# Patient Record
Sex: FEMALE | Race: WHITE | NOT HISPANIC OR LATINO | URBAN - METROPOLITAN AREA
[De-identification: names, ages, dates, MRNs, and addresses within clinical notes are randomized per-mention and may not be internally consistent; named-entity substitution may affect disease eponyms.]

---

## 2017-05-27 ENCOUNTER — INPATIENT (INPATIENT)
Facility: HOSPITAL | Age: 57
LOS: 5 days | Discharge: HOME HEALTH | DRG: 872 | End: 2017-06-02
Attending: SPECIALIST | Admitting: SPECIALIST
Payer: COMMERCIAL

## 2017-05-27 VITALS
SYSTOLIC BLOOD PRESSURE: 145 MMHG | RESPIRATION RATE: 18 BRPM | TEMPERATURE: 101 F | HEART RATE: 125 BPM | OXYGEN SATURATION: 99 % | WEIGHT: 229.94 LBS | HEIGHT: 69 IN | DIASTOLIC BLOOD PRESSURE: 68 MMHG

## 2017-05-27 DIAGNOSIS — K57.20 DIVERTICULITIS OF LARGE INTESTINE WITH PERFORATION AND ABSCESS WITHOUT BLEEDING: ICD-10-CM

## 2017-05-27 LAB
ALBUMIN SERPL ELPH-MCNC: 2.6 G/DL — LOW (ref 3.5–5)
ALP SERPL-CCNC: 97 U/L — SIGNIFICANT CHANGE UP (ref 40–120)
ALT FLD-CCNC: 18 U/L DA — SIGNIFICANT CHANGE UP (ref 10–60)
ANION GAP SERPL CALC-SCNC: 9 MMOL/L — SIGNIFICANT CHANGE UP (ref 5–17)
AST SERPL-CCNC: 20 U/L — SIGNIFICANT CHANGE UP (ref 10–40)
BASOPHILS # BLD AUTO: 0 K/UL — SIGNIFICANT CHANGE UP (ref 0–0.2)
BASOPHILS NFR BLD AUTO: 0.3 % — SIGNIFICANT CHANGE UP (ref 0–2)
BILIRUB SERPL-MCNC: 2.6 MG/DL — HIGH (ref 0.2–1.2)
BUN SERPL-MCNC: 14 MG/DL — SIGNIFICANT CHANGE UP (ref 7–18)
CALCIUM SERPL-MCNC: 9.5 MG/DL — SIGNIFICANT CHANGE UP (ref 8.4–10.5)
CHLORIDE SERPL-SCNC: 102 MMOL/L — SIGNIFICANT CHANGE UP (ref 96–108)
CO2 SERPL-SCNC: 26 MMOL/L — SIGNIFICANT CHANGE UP (ref 22–31)
CREAT SERPL-MCNC: 0.97 MG/DL — SIGNIFICANT CHANGE UP (ref 0.5–1.3)
EOSINOPHIL # BLD AUTO: 0 K/UL — SIGNIFICANT CHANGE UP (ref 0–0.5)
EOSINOPHIL NFR BLD AUTO: 0.1 % — SIGNIFICANT CHANGE UP (ref 0–6)
GLUCOSE SERPL-MCNC: 136 MG/DL — HIGH (ref 70–99)
HCT VFR BLD CALC: 46.3 % — HIGH (ref 34.5–45)
HGB BLD-MCNC: 15.1 G/DL — SIGNIFICANT CHANGE UP (ref 11.5–15.5)
LACTATE SERPL-SCNC: 2.3 MMOL/L — HIGH (ref 0.7–2)
LYMPHOCYTES # BLD AUTO: 0.7 K/UL — LOW (ref 1–3.3)
LYMPHOCYTES # BLD AUTO: 4.8 % — LOW (ref 13–44)
MCHC RBC-ENTMCNC: 29.9 PG — SIGNIFICANT CHANGE UP (ref 27–34)
MCHC RBC-ENTMCNC: 32.7 GM/DL — SIGNIFICANT CHANGE UP (ref 32–36)
MCV RBC AUTO: 91.4 FL — SIGNIFICANT CHANGE UP (ref 80–100)
MONOCYTES # BLD AUTO: 0.6 K/UL — SIGNIFICANT CHANGE UP (ref 0–0.9)
MONOCYTES NFR BLD AUTO: 4.1 % — SIGNIFICANT CHANGE UP (ref 2–14)
NEUTROPHILS # BLD AUTO: 13.9 K/UL — HIGH (ref 1.8–7.4)
NEUTROPHILS NFR BLD AUTO: 90.8 % — HIGH (ref 43–77)
PLATELET # BLD AUTO: 271 K/UL — SIGNIFICANT CHANGE UP (ref 150–400)
POTASSIUM SERPL-MCNC: 4.2 MMOL/L — SIGNIFICANT CHANGE UP (ref 3.5–5.3)
POTASSIUM SERPL-SCNC: 4.2 MMOL/L — SIGNIFICANT CHANGE UP (ref 3.5–5.3)
PROT SERPL-MCNC: 7.7 G/DL — SIGNIFICANT CHANGE UP (ref 6–8.3)
RBC # BLD: 5.06 M/UL — SIGNIFICANT CHANGE UP (ref 3.8–5.2)
RBC # FLD: 11.7 % — SIGNIFICANT CHANGE UP (ref 10.3–14.5)
SODIUM SERPL-SCNC: 137 MMOL/L — SIGNIFICANT CHANGE UP (ref 135–145)
WBC # BLD: 15.3 K/UL — HIGH (ref 3.8–10.5)
WBC # FLD AUTO: 15.3 K/UL — HIGH (ref 3.8–10.5)

## 2017-05-27 PROCEDURE — 99291 CRITICAL CARE FIRST HOUR: CPT

## 2017-05-27 PROCEDURE — 71020: CPT | Mod: 26

## 2017-05-27 PROCEDURE — 74177 CT ABD & PELVIS W/CONTRAST: CPT | Mod: 26

## 2017-05-27 RX ORDER — VANCOMYCIN HCL 1 G
1000 VIAL (EA) INTRAVENOUS ONCE
Qty: 0 | Refills: 0 | Status: COMPLETED | OUTPATIENT
Start: 2017-05-27 | End: 2017-05-27

## 2017-05-27 RX ORDER — PANTOPRAZOLE SODIUM 20 MG/1
40 TABLET, DELAYED RELEASE ORAL DAILY
Qty: 0 | Refills: 0 | Status: DISCONTINUED | OUTPATIENT
Start: 2017-05-27 | End: 2017-06-02

## 2017-05-27 RX ORDER — CIPROFLOXACIN LACTATE 400MG/40ML
400 VIAL (ML) INTRAVENOUS EVERY 12 HOURS
Qty: 0 | Refills: 0 | Status: DISCONTINUED | OUTPATIENT
Start: 2017-05-27 | End: 2017-05-27

## 2017-05-27 RX ORDER — AZTREONAM 2 G
1000 VIAL (EA) INJECTION ONCE
Qty: 0 | Refills: 0 | Status: COMPLETED | OUTPATIENT
Start: 2017-05-27 | End: 2017-05-27

## 2017-05-27 RX ORDER — METRONIDAZOLE 500 MG
500 TABLET ORAL EVERY 8 HOURS
Qty: 0 | Refills: 0 | Status: DISCONTINUED | OUTPATIENT
Start: 2017-05-27 | End: 2017-05-31

## 2017-05-27 RX ORDER — METRONIDAZOLE 500 MG
500 TABLET ORAL ONCE
Qty: 0 | Refills: 0 | Status: DISCONTINUED | OUTPATIENT
Start: 2017-05-27 | End: 2017-05-27

## 2017-05-27 RX ORDER — CIPROFLOXACIN LACTATE 400MG/40ML
400 VIAL (ML) INTRAVENOUS ONCE
Qty: 0 | Refills: 0 | Status: COMPLETED | OUTPATIENT
Start: 2017-05-27 | End: 2017-05-27

## 2017-05-27 RX ORDER — MORPHINE SULFATE 50 MG/1
4 CAPSULE, EXTENDED RELEASE ORAL EVERY 4 HOURS
Qty: 0 | Refills: 0 | Status: DISCONTINUED | OUTPATIENT
Start: 2017-05-27 | End: 2017-06-02

## 2017-05-27 RX ORDER — ONDANSETRON 8 MG/1
4 TABLET, FILM COATED ORAL ONCE
Qty: 0 | Refills: 0 | Status: COMPLETED | OUTPATIENT
Start: 2017-05-27 | End: 2017-05-27

## 2017-05-27 RX ORDER — SODIUM CHLORIDE 9 MG/ML
1000 INJECTION INTRAMUSCULAR; INTRAVENOUS; SUBCUTANEOUS ONCE
Qty: 0 | Refills: 0 | Status: COMPLETED | OUTPATIENT
Start: 2017-05-27 | End: 2017-05-27

## 2017-05-27 RX ORDER — ONDANSETRON 8 MG/1
4 TABLET, FILM COATED ORAL EVERY 6 HOURS
Qty: 0 | Refills: 0 | Status: DISCONTINUED | OUTPATIENT
Start: 2017-05-27 | End: 2017-06-02

## 2017-05-27 RX ORDER — HEPARIN SODIUM 5000 [USP'U]/ML
5000 INJECTION INTRAVENOUS; SUBCUTANEOUS EVERY 8 HOURS
Qty: 0 | Refills: 0 | Status: DISCONTINUED | OUTPATIENT
Start: 2017-05-27 | End: 2017-06-02

## 2017-05-27 RX ORDER — AZTREONAM 2 G
1000 VIAL (EA) INJECTION EVERY 8 HOURS
Qty: 0 | Refills: 0 | Status: DISCONTINUED | OUTPATIENT
Start: 2017-05-28 | End: 2017-05-31

## 2017-05-27 RX ORDER — VANCOMYCIN HCL 1 G
1000 VIAL (EA) INTRAVENOUS EVERY 12 HOURS
Qty: 0 | Refills: 0 | Status: DISCONTINUED | OUTPATIENT
Start: 2017-05-28 | End: 2017-05-29

## 2017-05-27 RX ORDER — AZTREONAM 2 G
VIAL (EA) INJECTION
Qty: 0 | Refills: 0 | Status: DISCONTINUED | OUTPATIENT
Start: 2017-05-27 | End: 2017-05-31

## 2017-05-27 RX ORDER — SODIUM CHLORIDE 9 MG/ML
1000 INJECTION, SOLUTION INTRAVENOUS
Qty: 0 | Refills: 0 | Status: DISCONTINUED | OUTPATIENT
Start: 2017-05-27 | End: 2017-05-30

## 2017-05-27 RX ORDER — METRONIDAZOLE 500 MG
500 TABLET ORAL ONCE
Qty: 0 | Refills: 0 | Status: COMPLETED | OUTPATIENT
Start: 2017-05-27 | End: 2017-05-27

## 2017-05-27 RX ORDER — VANCOMYCIN HCL 1 G
VIAL (EA) INTRAVENOUS
Qty: 0 | Refills: 0 | Status: DISCONTINUED | OUTPATIENT
Start: 2017-05-27 | End: 2017-05-29

## 2017-05-27 RX ORDER — ACETAMINOPHEN 500 MG
650 TABLET ORAL EVERY 6 HOURS
Qty: 0 | Refills: 0 | Status: DISCONTINUED | OUTPATIENT
Start: 2017-05-27 | End: 2017-06-02

## 2017-05-27 RX ADMIN — Medication 250 MILLIGRAM(S): at 21:58

## 2017-05-27 RX ADMIN — ONDANSETRON 4 MILLIGRAM(S): 8 TABLET, FILM COATED ORAL at 16:16

## 2017-05-27 RX ADMIN — HEPARIN SODIUM 5000 UNIT(S): 5000 INJECTION INTRAVENOUS; SUBCUTANEOUS at 20:19

## 2017-05-27 RX ADMIN — SODIUM CHLORIDE 1000 MILLILITER(S): 9 INJECTION INTRAMUSCULAR; INTRAVENOUS; SUBCUTANEOUS at 16:17

## 2017-05-27 RX ADMIN — PANTOPRAZOLE SODIUM 40 MILLIGRAM(S): 20 TABLET, DELAYED RELEASE ORAL at 20:19

## 2017-05-27 RX ADMIN — Medication 200 MILLIGRAM(S): at 19:19

## 2017-05-27 RX ADMIN — Medication 50 MILLIGRAM(S): at 23:25

## 2017-05-27 RX ADMIN — Medication 100 MILLIGRAM(S): at 20:54

## 2017-05-27 NOTE — ED ADULT NURSE NOTE - OBJECTIVE STATEMENT
pt came in with c/o abdominal pain pt sent by PMD for obstruction  and abdominal distention pt abdomen soft non distended  + bowel sound mariana any vomiting but states she feels nauseaus pt came in with c/o abdominal pain pt sent by PMD for obstruction  and abdominal distention pt abdomen soft non distended  + bowel sound denies any vomiting but states she feels nausea

## 2017-05-27 NOTE — H&P ADULT - HISTORY OF PRESENT ILLNESS
56 y/o female denies PMH or PSH; c/o several days of gassy abd pain/bloating, fever; sent by PCP for eval  denies h/o diverticulitis or colonoscopy  CT done in ED shows small amt free air with concern for perf sig diverticulitis vs ileum; reactive ileus picture

## 2017-05-27 NOTE — ED PROVIDER NOTE - CRITICAL CARE PROVIDED
documentation/consult w/ pt's family directly relating to pts condition/interpretation of diagnostic studies/additional history taking/direct patient care (not related to procedure)/consultation with other physicians

## 2017-05-27 NOTE — H&P ADULT - PROBLEM SELECTOR PLAN 1
npo, hydrate, nava cath, strict IOs, repeat lactate, cbc, bmp  serial abd exams  ID-Dr Walker consulted, vanco, azactam, flagyl started  poss pre-op mode for ex lap

## 2017-05-27 NOTE — ED PROVIDER NOTE - OBJECTIVE STATEMENT
sent by pmd dr morrell for abd pain concern for obstruction  pt with complaint of pain to abd for about a week.  started with vomiting then had some loose BM.s  feels very "bloated and gases"  now feels that abd "got hit a thousand times"

## 2017-05-27 NOTE — H&P ADULT - NSHPLABSRESULTS_GEN_ALL_CORE
EXAM:  CT ABDOMEN AND PELVIS OC IC                            PROCEDURE DATE:  05/27/2017        INTERPRETATION:  CLINICAL INFORMATION: Abdominal pain and distention.    TECHNIQUE: Contrast enhanced CT of the abdomen and pelvis was performed   with coronal and sagittal reformats. 90 cc Omnipaque 350 were   administered intravenously and 10 cc were discarded. Oral contrast was   administered.     COMPARISON: None available.    FINDINGS:    LOWER CHEST: Within normal limits.    HEPATOBILIARY: Subcentimeter hypodense hepatic lesions too small to   characterize. Gallbladder and bile ducts within normal limits.  PANCREAS: Within normal limits.  SPLEEN: Within normal limits.  ADRENALS: Within normal limits.    KIDNEYS/URETERS/BLADDER: Within normal limits.  REPRODUCTIVE ORGANS: Right ovarian dermoid measuring 6.2 x 8.3 x 7.2 cm   (TR x AP x CC). Left ovarian dermoid measuring 4 x 5.3 x 3.8 cm (TR x AP   x CC). Uterus within normal limits.    BOWEL/PERITONEUM: Small amount of pneumoperitoneum. Marked mesenteric   edema and small amount of free air and fluid along the distal ileal   mesentery. Marked submucosal edema and inflammation of the distal ileum.   Sigmoid colonic diverticulosis with mild inflammatory changes abutting   the inflamed distal ileal loops. No rim-enhancing fluid collection.   Proximal contrast and fluid distended small bowel without transition   point has appearance favoring ileus. Normal appendix. Portions of the   gastrointestinal tract are collapsed, limiting evaluation.     VESSELS:  Within normal limits.  LYMPHATICS/RETROPERITONEUM: No lymphadenopathy. No retroperitoneal   hematoma.      SOFT TISSUES: Within normal limits.  BONES: Degenerative changes at L2-L3.    IMPRESSION:   Pneumoperitoneum secondary to bowel perforation. Marked submucosal edema   and inflammation of the distal ileum with air and fluid along the ileal   mesentery. Mild inflammatory changes of the adjacent sigmoid colon with   colonic diverticulosis.    Given concentration of air and fluid along the ileal mesentery, ileal   perforation with secondary inflammation of the sigmoid colon is favored,   however perforated sigmoid diverticulitis with reactive inflammation of   the distal ileum may also be considered.    Call Back:  I discussed this case with Dr. Morton at 6:30 PM on 5/27/2017.    Hospital policies for call back including read back policy were   followed. The verbal communication call back supplements this written   report.                  WILLIAMS JAEGER M.D., ATTENDING RADIOLOGIST  This document has been electronically signed. May 27 2017  6:41PM

## 2017-05-27 NOTE — H&P ADULT - NSHPPHYSICALEXAM_GEN_ALL_CORE
NAD  appears comfortable  alert/oriented x 3  abd exam obese, soft, generalized tenderness; no guarding or peritoneal signs

## 2017-05-28 DIAGNOSIS — D72.829 ELEVATED WHITE BLOOD CELL COUNT, UNSPECIFIED: ICD-10-CM

## 2017-05-28 DIAGNOSIS — R50.9 FEVER, UNSPECIFIED: ICD-10-CM

## 2017-05-28 LAB
ANION GAP SERPL CALC-SCNC: 7 MMOL/L — SIGNIFICANT CHANGE UP (ref 5–17)
BUN SERPL-MCNC: 10 MG/DL — SIGNIFICANT CHANGE UP (ref 7–18)
CALCIUM SERPL-MCNC: 8.6 MG/DL — SIGNIFICANT CHANGE UP (ref 8.4–10.5)
CHLORIDE SERPL-SCNC: 109 MMOL/L — HIGH (ref 96–108)
CO2 SERPL-SCNC: 25 MMOL/L — SIGNIFICANT CHANGE UP (ref 22–31)
CREAT SERPL-MCNC: 0.75 MG/DL — SIGNIFICANT CHANGE UP (ref 0.5–1.3)
GLUCOSE SERPL-MCNC: 109 MG/DL — HIGH (ref 70–99)
HCT VFR BLD CALC: 37.1 % — SIGNIFICANT CHANGE UP (ref 34.5–45)
HGB BLD-MCNC: 12.9 G/DL — SIGNIFICANT CHANGE UP (ref 11.5–15.5)
LACTATE SERPL-SCNC: 0.8 MMOL/L — SIGNIFICANT CHANGE UP (ref 0.7–2)
LACTATE SERPL-SCNC: 1.2 MMOL/L — SIGNIFICANT CHANGE UP (ref 0.7–2)
MCHC RBC-ENTMCNC: 31.6 PG — SIGNIFICANT CHANGE UP (ref 27–34)
MCHC RBC-ENTMCNC: 34.7 GM/DL — SIGNIFICANT CHANGE UP (ref 32–36)
MCV RBC AUTO: 91 FL — SIGNIFICANT CHANGE UP (ref 80–100)
PLATELET # BLD AUTO: 237 K/UL — SIGNIFICANT CHANGE UP (ref 150–400)
POTASSIUM SERPL-MCNC: 3.5 MMOL/L — SIGNIFICANT CHANGE UP (ref 3.5–5.3)
POTASSIUM SERPL-SCNC: 3.5 MMOL/L — SIGNIFICANT CHANGE UP (ref 3.5–5.3)
RBC # BLD: 4.07 M/UL — SIGNIFICANT CHANGE UP (ref 3.8–5.2)
RBC # FLD: 12.7 % — SIGNIFICANT CHANGE UP (ref 10.3–14.5)
SODIUM SERPL-SCNC: 141 MMOL/L — SIGNIFICANT CHANGE UP (ref 135–145)
WBC # BLD: 11.8 K/UL — HIGH (ref 3.8–10.5)
WBC # FLD AUTO: 11.8 K/UL — HIGH (ref 3.8–10.5)

## 2017-05-28 RX ORDER — SODIUM CHLORIDE 9 MG/ML
1000 INJECTION INTRAMUSCULAR; INTRAVENOUS; SUBCUTANEOUS ONCE
Qty: 0 | Refills: 0 | Status: COMPLETED | OUTPATIENT
Start: 2017-05-28 | End: 2017-05-28

## 2017-05-28 RX ADMIN — Medication 650 MILLIGRAM(S): at 21:57

## 2017-05-28 RX ADMIN — Medication 250 MILLIGRAM(S): at 17:26

## 2017-05-28 RX ADMIN — Medication 50 MILLIGRAM(S): at 21:25

## 2017-05-28 RX ADMIN — Medication 100 MILLIGRAM(S): at 13:14

## 2017-05-28 RX ADMIN — Medication 100 MILLIGRAM(S): at 21:25

## 2017-05-28 RX ADMIN — PANTOPRAZOLE SODIUM 40 MILLIGRAM(S): 20 TABLET, DELAYED RELEASE ORAL at 11:31

## 2017-05-28 RX ADMIN — Medication 50 MILLIGRAM(S): at 13:13

## 2017-05-28 RX ADMIN — HEPARIN SODIUM 5000 UNIT(S): 5000 INJECTION INTRAVENOUS; SUBCUTANEOUS at 05:15

## 2017-05-28 RX ADMIN — Medication 50 MILLIGRAM(S): at 06:53

## 2017-05-28 RX ADMIN — HEPARIN SODIUM 5000 UNIT(S): 5000 INJECTION INTRAVENOUS; SUBCUTANEOUS at 13:13

## 2017-05-28 RX ADMIN — Medication 100 MILLIGRAM(S): at 05:16

## 2017-05-28 RX ADMIN — Medication 250 MILLIGRAM(S): at 06:52

## 2017-05-28 RX ADMIN — SODIUM CHLORIDE 500 MILLILITER(S): 9 INJECTION INTRAMUSCULAR; INTRAVENOUS; SUBCUTANEOUS at 00:30

## 2017-05-28 RX ADMIN — HEPARIN SODIUM 5000 UNIT(S): 5000 INJECTION INTRAVENOUS; SUBCUTANEOUS at 21:25

## 2017-05-28 NOTE — CONSULT NOTE ADULT - SUBJECTIVE AND OBJECTIVE BOX
HPI:  58 y/o female denies PMH or PSH; c/o several days of having abd pain , bloating and  fever; sent by PCP for eval  denies h/o diverticulitis or colonoscopy      PAST MEDICAL & SURGICAL HISTORY:  Anxiety  No significant past surgical history      penicillin (Hives)      Meds:  dextrose 5% + sodium chloride 0.9%. 1000milliLiter(s) IV Continuous <Continuous>  ondansetron Injectable 4milliGRAM(s) IV Push every 6 hours PRN  morphine  - Injectable 4milliGRAM(s) IV Push every 4 hours PRN  pantoprazole  Injectable 40milliGRAM(s) IV Push daily  metroNIDAZOLE  IVPB 500milliGRAM(s) IV Intermittent every 8 hours  heparin  Injectable 5000Unit(s) SubCutaneous every 8 hours  acetaminophen  Suppository 650milliGRAM(s) Rectal every 6 hours PRN  vancomycin  IVPB  IV Intermittent   vancomycin  IVPB 1000milliGRAM(s) IV Intermittent every 12 hours  aztreonam  IVPB 1000milliGRAM(s) IV Intermittent every 8 hours  aztreonam  IVPB  IV Intermittent       SOCIAL HISTORY:  Smoker:  YES / NO       ETOH use:  YES / NO                Ilicit Drug use:  YES / NO    FAMILY HISTORY:not sig      VITALS:  Vital Signs Last 24 Hrs  T(C): 37.4, Max: 38.7 (05-27 @ 15:58)  T(F): 99.3, Max: 101.6 (05-27 @ 15:58)  HR: 93 (72 - 125)  BP: 137/60 (103/65 - 145/68)  BP(mean): --  RR: 16 (16 - 18)  SpO2: 96% (96% - 99%)    LABS/DIAGNOSTIC TESTS:                          12.9   11.8  )-----------( 237      ( 28 May 2017 05:53 )             37.1     WBC Count: 11.8 K/uL (05-28 @ 05:53)  WBC Count: 15.3 K/uL (05-27 @ 16:31)      05-28    141  |  109<H>  |  10  ----------------------------<  109<H>  3.5   |  25  |  0.75    Ca    8.6      28 May 2017 05:53    TPro  7.7  /  Alb  2.6<L>  /  TBili  2.6<H>  /  DBili  x   /  AST  20  /  ALT  18  /  AlkPhos  97  05-27          LIVER FUNCTIONS - ( 27 May 2017 16:31 )  Alb: 2.6 g/dL / Pro: 7.7 g/dL / ALK PHOS: 97 U/L / ALT: 18 U/L DA / AST: 20 U/L / GGT: x                 LACTATE:Lactate, Blood: 0.8 mmol/L (05-28 @ 05:53)  Lactate, Blood: 1.2 mmol/L (05-27 @ 23:56)  Lactate, Blood: 2.3 mmol/L (05-27 @ 16:31)      ABG -     CULTURES:       RADIOLOGY:    EXAM:  CT ABDOMEN AND PELVIS OC IC                            PROCEDURE DATE:  05/27/2017        INTERPRETATION:  CLINICAL INFORMATION: Abdominal pain and distention.    TECHNIQUE: Contrast enhanced CT of the abdomen and pelvis was performed   withcoronal and sagittal reformats. 90 cc Omnipaque 350 were   administered intravenously and 10 cc were discarded. Oral contrast was   administered.     COMPARISON: None available.    FINDINGS:    LOWER CHEST: Within normal limits.    HEPATOBILIARY: Subcentimeter hypodense hepatic lesions too small to   characterize. Gallbladder and bile ducts within normal limits.  PANCREAS: Within normal limits.  SPLEEN: Within normal limits.  ADRENALS: Within normal limits.    KIDNEYS/URETERS/BLADDER: Within normal limits.  REPRODUCTIVE ORGANS: Right ovarian dermoid measuring 6.2 x 8.3 x 7.2 cm   (TR x AP x CC). Left ovarian dermoid measuring 4 x 5.3 x 3.8 cm (TR x AP   x CC). Uterus within normal limits.    BOWEL/PERITONEUM: Small amount of pneumoperitoneum.Marked mesenteric   edema and small amount of free air and fluid along the distal ileal   mesentery. Marked submucosal edema and inflammation of the distal ileum.   Sigmoid colonic diverticulosis with mild inflammatory changes abutting   the inflameddistal ileal loops. No rim-enhancing fluid collection.   Proximal contrast and fluid distended small bowel without transition   point has appearance favoring ileus. Normal appendix. Portions of the   gastrointestinal tract are collapsed, limiting evaluation.     VESSELS:  Within normal limits.  LYMPHATICS/RETROPERITONEUM: No lymphadenopathy. No retroperitoneal   hematoma.      SOFT TISSUES: Within normal limits.  BONES: Degenerative changes at L2-L3.    IMPRESSION:   Pneumoperitoneum secondary tobowel perforation. Marked submucosal edema   and inflammation of the distal ileum with air and fluid along the ileal   mesentery. Mild inflammatory changes of the adjacent sigmoid colon with   colonic diverticulosis.    Given concentration of air andfluid along the ileal mesentery, ileal   perforation with secondary inflammation of the sigmoid colon is favored,   however perforated sigmoid diverticulitis with reactive inflammation of   the distal ileum may also be considered.    Call Back:  I discussed this case with Dr. Morton at 6:30 PM on 5/27/2017.    Hospital policies for call back including read back policy were         EXAM:  CHEST PA & LAT                            PROCEDURE DATE:  05/27/2017        INTERPRETATION:  Chest radiographs     CLINICAL INFORMATION:  Abdominal pain for 3 days.    TECHNIQUE:  Frontal and lateral views of the chest were obtained.    FINDINGS:  No previous examinations are available for review.    The lungs are clear.  No pleural abnormality is seen.       The heart and mediastinum appear intact. The visualized skeleton is   unremarkable.    There is free air underneath the diaphragm.    IMPRESSION:  No evidence of active chest disease. 3 air underneath the   diaphragm compatible with a perforated appendicitis.      I discussed this case with  at 4:45 PM on on 5/27/2017.    Hospital policies for call back including read back policy were followed.     ROS  [  ] UNABLE TO ELICIT HPI:  58 y/o female with no PMH, is admitted after c/o several days of having abd pain, bloating and  fever; sent by PCP for eval.  + one episode of nausea and vomiting on wednesday.  Pt denies any surgical history and has never had a colonoscopy.        PAST MEDICAL & SURGICAL HISTORY:  Anxiety  No significant past surgical history      penicillin (Hives)      Meds:  dextrose 5% + sodium chloride 0.9%. 1000milliLiter(s) IV Continuous <Continuous>  ondansetron Injectable 4milliGRAM(s) IV Push every 6 hours PRN  morphine  - Injectable 4milliGRAM(s) IV Push every 4 hours PRN  pantoprazole  Injectable 40milliGRAM(s) IV Push daily  metroNIDAZOLE  IVPB 500milliGRAM(s) IV Intermittent every 8 hours  heparin  Injectable 5000Unit(s) SubCutaneous every 8 hours  acetaminophen  Suppository 650milliGRAM(s) Rectal every 6 hours PRN  vancomycin  IVPB  IV Intermittent   vancomycin  IVPB 1000milliGRAM(s) IV Intermittent every 12 hours  aztreonam  IVPB 1000milliGRAM(s) IV Intermittent every 8 hours  aztreonam  IVPB  IV Intermittent       SOCIAL HISTORY:  Smoker:  YES- 1PPD/ 20 + years  ETOH use:  NO                Ilicit Drug use:  NO    FAMILY HISTORY:not sig      VITALS:  Vital Signs Last 24 Hrs  T(C): 37.4, Max: 38.7 (05-27 @ 15:58)  T(F): 99.3, Max: 101.6 (05-27 @ 15:58)  HR: 93 (72 - 125)  BP: 137/60 (103/65 - 145/68)  BP(mean): --  RR: 16 (16 - 18)  SpO2: 96% (96% - 99%)    LABS/DIAGNOSTIC TESTS:                          12.9   11.8  )-----------( 237      ( 28 May 2017 05:53 )             37.1     WBC Count: 11.8 K/uL (05-28 @ 05:53)  WBC Count: 15.3 K/uL (05-27 @ 16:31)      05-28    141  |  109<H>  |  10  ----------------------------<  109<H>  3.5   |  25  |  0.75    Ca    8.6      28 May 2017 05:53    TPro  7.7  /  Alb  2.6<L>  /  TBili  2.6<H>  /  DBili  x   /  AST  20  /  ALT  18  /  AlkPhos  97  05-27          LIVER FUNCTIONS - ( 27 May 2017 16:31 )  Alb: 2.6 g/dL / Pro: 7.7 g/dL / ALK PHOS: 97 U/L / ALT: 18 U/L DA / AST: 20 U/L / GGT: x                 LACTATE:Lactate, Blood: 0.8 mmol/L (05-28 @ 05:53)  Lactate, Blood: 1.2 mmol/L (05-27 @ 23:56)  Lactate, Blood: 2.3 mmol/L (05-27 @ 16:31)      ABG -     CULTURES:       RADIOLOGY:    EXAM:  CT ABDOMEN AND PELVIS OC IC                            PROCEDURE DATE:  05/27/2017        INTERPRETATION:  CLINICAL INFORMATION: Abdominal pain and distention.    TECHNIQUE: Contrast enhanced CT of the abdomen and pelvis was performed   withcoronal and sagittal reformats. 90 cc Omnipaque 350 were   administered intravenously and 10 cc were discarded. Oral contrast was   administered.     COMPARISON: None available.    FINDINGS:    LOWER CHEST: Within normal limits.    HEPATOBILIARY: Subcentimeter hypodense hepatic lesions too small to   characterize. Gallbladder and bile ducts within normal limits.  PANCREAS: Within normal limits.  SPLEEN: Within normal limits.  ADRENALS: Within normal limits.    KIDNEYS/URETERS/BLADDER: Within normal limits.  REPRODUCTIVE ORGANS: Right ovarian dermoid measuring 6.2 x 8.3 x 7.2 cm   (TR x AP x CC). Left ovarian dermoid measuring 4 x 5.3 x 3.8 cm (TR x AP   x CC). Uterus within normal limits.    BOWEL/PERITONEUM: Small amount of pneumoperitoneum.Marked mesenteric   edema and small amount of free air and fluid along the distal ileal   mesentery. Marked submucosal edema and inflammation of the distal ileum.   Sigmoid colonic diverticulosis with mild inflammatory changes abutting   the inflameddistal ileal loops. No rim-enhancing fluid collection.   Proximal contrast and fluid distended small bowel without transition   point has appearance favoring ileus. Normal appendix. Portions of the   gastrointestinal tract are collapsed, limiting evaluation.     VESSELS:  Within normal limits.  LYMPHATICS/RETROPERITONEUM: No lymphadenopathy. No retroperitoneal   hematoma.      SOFT TISSUES: Within normal limits.  BONES: Degenerative changes at L2-L3.    IMPRESSION:   Pneumoperitoneum secondary tobowel perforation. Marked submucosal edema   and inflammation of the distal ileum with air and fluid along the ileal   mesentery. Mild inflammatory changes of the adjacent sigmoid colon with   colonic diverticulosis.    Given concentration of air andfluid along the ileal mesentery, ileal   perforation with secondary inflammation of the sigmoid colon is favored,   however perforated sigmoid diverticulitis with reactive inflammation of   the distal ileum may also be considered.    Call Back:  I discussed this case with Dr. Morton at 6:30 PM on 5/27/2017.    Hospital policies for call back including read back policy were         EXAM:  CHEST PA & LAT                            PROCEDURE DATE:  05/27/2017        INTERPRETATION:  Chest radiographs     CLINICAL INFORMATION:  Abdominal pain for 3 days.    TECHNIQUE:  Frontal and lateral views of the chest were obtained.    FINDINGS:  No previous examinations are available for review.    The lungs are clear.  No pleural abnormality is seen.       The heart and mediastinum appear intact. The visualized skeleton is   unremarkable.    There is free air underneath the diaphragm.    IMPRESSION:  No evidence of active chest disease. 3 air underneath the   diaphragm compatible with a perforated appendicitis.      I discussed this case with  at 4:45 PM on on 5/27/2017.    Hospital policies for call back including read back policy were followed.

## 2017-05-28 NOTE — PROGRESS NOTE ADULT - PROBLEM SELECTOR PLAN 1
C/w NPO,   IV hydration   C/w [place]   Strict I&Os  Serial abd exams/ Observation   f/u ID-Dr Walker,   C/w vanco, azactam, flagyl   DVT ppx

## 2017-05-28 NOTE — CONSULT NOTE ADULT - PROBLEM SELECTOR RECOMMENDATION 9
- continue Azactam 1gram iv q 8 hours  - continue vanco 1gram 1v q 12  - Flagyl 500mg iv q 8  - repeat CT - continue Azactam 1gram iv q 8 hours  - continue vanco 1gram 1v q 12  - Flagyl 500mg iv q 8  - repeat CT in 2 days

## 2017-05-28 NOTE — PROGRESS NOTE ADULT - SUBJECTIVE AND OBJECTIVE BOX
INTERVAL HPI/OVERNIGHT EVENTS:    Pt seen and examined at bedside. States pain improved. Admits to lose BM early this AM. Denies nausea, vomiting. No fever, chills, SOB or CP.      Vital Signs Last 24 Hrs  T(C): 37.4, Max: 38.7 (05-27 @ 15:58)  T(F): 99.3, Max: 101.6 (05-27 @ 15:58)  HR: 93 (72 - 125)  BP: 137/60 (103/65 - 145/68)  BP(mean): --  RR: 16 (16 - 18)  SpO2: 96% (96% - 99%)  I&O's Detail    I & Os for current day (as of 28 May 2017 08:41)  =============================================  IN:    Sodium Chloride 0.9% IV Bolus: 1000 ml    dextrose 5% + sodium chloride 0.9%.: 900 ml    Total IN: 1900 ml  ---------------------------------------------  OUT:    Indwelling Catheter - Urethral: 800 ml    Total OUT: 800 ml  ---------------------------------------------  Total NET: 1100 ml    pantoprazole  Injectable 40milliGRAM(s) IV Push daily  metroNIDAZOLE  IVPB 500milliGRAM(s) IV Intermittent every 8 hours  vancomycin  IVPB  IV Intermittent   vancomycin  IVPB 1000milliGRAM(s) IV Intermittent every 12 hours  aztreonam  IVPB 1000milliGRAM(s) IV Intermittent every 8 hours  aztreonam  IVPB  IV Intermittent       Physical Exam  General: AAOx3, No acute distress  Abdomen: soft,  nondistended, mild  LLQ TTP, no rebound tenderness, no guarding, no palpable masses  Extremities: non edematous, no calf pain bilaterally    Labs:                        12.9   11.8  )-----------( 237      ( 28 May 2017 05:53 )             37.1     05-28    141  |  109<H>  |  10  ----------------------------<  109<H>  3.5   |  25  |  0.75    Ca    8.6      28 May 2017 05:53    TPro  7.7  /  Alb  2.6<L>  /  TBili  2.6<H>  /  DBili  x   /  AST  20  /  ALT  18  /  AlkPhos  97  05-27

## 2017-05-29 DIAGNOSIS — E87.6 HYPOKALEMIA: ICD-10-CM

## 2017-05-29 LAB
ANION GAP SERPL CALC-SCNC: 8 MMOL/L — SIGNIFICANT CHANGE UP (ref 5–17)
BUN SERPL-MCNC: 8 MG/DL — SIGNIFICANT CHANGE UP (ref 7–18)
CALCIUM SERPL-MCNC: 8.5 MG/DL — SIGNIFICANT CHANGE UP (ref 8.4–10.5)
CHLORIDE SERPL-SCNC: 114 MMOL/L — HIGH (ref 96–108)
CO2 SERPL-SCNC: 23 MMOL/L — SIGNIFICANT CHANGE UP (ref 22–31)
CREAT SERPL-MCNC: 0.65 MG/DL — SIGNIFICANT CHANGE UP (ref 0.5–1.3)
GLUCOSE SERPL-MCNC: 113 MG/DL — HIGH (ref 70–99)
HCT VFR BLD CALC: 34.7 % — SIGNIFICANT CHANGE UP (ref 34.5–45)
HGB BLD-MCNC: 11.4 G/DL — LOW (ref 11.5–15.5)
MCHC RBC-ENTMCNC: 30.2 PG — SIGNIFICANT CHANGE UP (ref 27–34)
MCHC RBC-ENTMCNC: 32.8 GM/DL — SIGNIFICANT CHANGE UP (ref 32–36)
MCV RBC AUTO: 92.2 FL — SIGNIFICANT CHANGE UP (ref 80–100)
PLATELET # BLD AUTO: 243 K/UL — SIGNIFICANT CHANGE UP (ref 150–400)
POTASSIUM SERPL-MCNC: 3.3 MMOL/L — LOW (ref 3.5–5.3)
POTASSIUM SERPL-SCNC: 3.3 MMOL/L — LOW (ref 3.5–5.3)
RBC # BLD: 3.76 M/UL — LOW (ref 3.8–5.2)
RBC # FLD: 11.9 % — SIGNIFICANT CHANGE UP (ref 10.3–14.5)
SODIUM SERPL-SCNC: 145 MMOL/L — SIGNIFICANT CHANGE UP (ref 135–145)
VANCOMYCIN TROUGH SERPL-MCNC: 4.9 UG/ML — LOW (ref 10–20)
WBC # BLD: 10.7 K/UL — HIGH (ref 3.8–10.5)
WBC # FLD AUTO: 10.7 K/UL — HIGH (ref 3.8–10.5)

## 2017-05-29 RX ORDER — SODIUM CHLORIDE 9 MG/ML
500 INJECTION INTRAMUSCULAR; INTRAVENOUS; SUBCUTANEOUS ONCE
Qty: 0 | Refills: 0 | Status: COMPLETED | OUTPATIENT
Start: 2017-05-29 | End: 2017-05-29

## 2017-05-29 RX ORDER — POTASSIUM CHLORIDE 20 MEQ
10 PACKET (EA) ORAL
Qty: 0 | Refills: 0 | Status: COMPLETED | OUTPATIENT
Start: 2017-05-29 | End: 2017-05-29

## 2017-05-29 RX ORDER — VANCOMYCIN HCL 1 G
1500 VIAL (EA) INTRAVENOUS EVERY 12 HOURS
Qty: 0 | Refills: 0 | Status: DISCONTINUED | OUTPATIENT
Start: 2017-05-29 | End: 2017-05-31

## 2017-05-29 RX ADMIN — Medication 100 MILLIEQUIVALENT(S): at 09:09

## 2017-05-29 RX ADMIN — HEPARIN SODIUM 5000 UNIT(S): 5000 INJECTION INTRAVENOUS; SUBCUTANEOUS at 05:31

## 2017-05-29 RX ADMIN — Medication 100 MILLIGRAM(S): at 21:37

## 2017-05-29 RX ADMIN — SODIUM CHLORIDE 150 MILLILITER(S): 9 INJECTION, SOLUTION INTRAVENOUS at 05:30

## 2017-05-29 RX ADMIN — Medication 300 MILLIGRAM(S): at 18:14

## 2017-05-29 RX ADMIN — PANTOPRAZOLE SODIUM 40 MILLIGRAM(S): 20 TABLET, DELAYED RELEASE ORAL at 11:18

## 2017-05-29 RX ADMIN — Medication 50 MILLIGRAM(S): at 05:30

## 2017-05-29 RX ADMIN — Medication 100 MILLIEQUIVALENT(S): at 11:17

## 2017-05-29 RX ADMIN — Medication 250 MILLIGRAM(S): at 07:07

## 2017-05-29 RX ADMIN — Medication 100 MILLIEQUIVALENT(S): at 09:44

## 2017-05-29 RX ADMIN — SODIUM CHLORIDE 3000 MILLILITER(S): 9 INJECTION INTRAMUSCULAR; INTRAVENOUS; SUBCUTANEOUS at 09:09

## 2017-05-29 RX ADMIN — Medication 50 MILLIGRAM(S): at 21:37

## 2017-05-29 RX ADMIN — Medication 100 MILLIGRAM(S): at 14:14

## 2017-05-29 RX ADMIN — SODIUM CHLORIDE 150 MILLILITER(S): 9 INJECTION, SOLUTION INTRAVENOUS at 14:14

## 2017-05-29 RX ADMIN — HEPARIN SODIUM 5000 UNIT(S): 5000 INJECTION INTRAVENOUS; SUBCUTANEOUS at 14:14

## 2017-05-29 RX ADMIN — HEPARIN SODIUM 5000 UNIT(S): 5000 INJECTION INTRAVENOUS; SUBCUTANEOUS at 21:37

## 2017-05-29 RX ADMIN — Medication 100 MILLIGRAM(S): at 05:30

## 2017-05-29 RX ADMIN — Medication 50 MILLIGRAM(S): at 14:14

## 2017-05-29 NOTE — PROGRESS NOTE ADULT - GASTROINTESTINAL DETAILS
no organomegaly/no rigidity/bowel sounds normal/soft/no distention/no rebound tenderness/no guarding

## 2017-05-29 NOTE — PROGRESS NOTE ADULT - PROBLEM SELECTOR PLAN 1
con't abx  NPO  IVF  Pain control prn  OOB  observation con't abx  NPO  IVF  Tylenol prn fever  Pain control prn  OOB  observation

## 2017-05-29 NOTE — PROGRESS NOTE ADULT - SUBJECTIVE AND OBJECTIVE BOX
57y Female    Meds:  metroNIDAZOLE  IVPB 500milliGRAM(s) IV Intermittent every 8 hours  vancomycin  IVPB  IV Intermittent   vancomycin  IVPB 1000milliGRAM(s) IV Intermittent every 12 hours  aztreonam  IVPB 1000milliGRAM(s) IV Intermittent every 8 hours  aztreonam  IVPB  IV Intermittent     Allergies    penicillin (Hives)    Intolerances        VITALS:  Vital Signs Last 24 Hrs  T(C): 37, Max: 38.2 (05-28 @ 20:43)  T(F): 98.6, Max: 100.8 (05-28 @ 20:43)  HR: 74 (74 - 102)  BP: 119/53 (110/53 - 128/56)  BP(mean): --  RR: 18 (17 - 18)  SpO2: 98% (98% - 98%)    LABS/DIAGNOSTIC TESTS:                          11.4   10.7  )-----------( 243      ( 29 May 2017 06:35 )             34.7         05-29    145  |  114<H>  |  8   ----------------------------<  113<H>  3.3<L>   |  23  |  0.65    Ca    8.5      29 May 2017 06:35    TPro  7.7  /  Alb  2.6<L>  /  TBili  2.6<H>  /  DBili  x   /  AST  20  /  ALT  18  /  AlkPhos  97  05-27      LIVER FUNCTIONS - ( 27 May 2017 16:31 )  Alb: 2.6 g/dL / Pro: 7.7 g/dL / ALK PHOS: 97 U/L / ALT: 18 U/L DA / AST: 20 U/L / GGT: x             CULTURES: .Blood Blood  05-28 @ 11:49   No growth to date.  --  --            RADIOLOGY:      ROS:  [  ] UNABLE TO ELICIT 57y Female with perforated diverticulitis,who is feeling better today with minimal abdominal pain, she had a low grade fever last night to 100.8 but otherwise afebrile. she has no nausea , vomitting or significant diarrhea. she was started on clears today. she has not used a pcn product in over 20 years but even when she did she only got a rash from it and so will likely switch her abxs tomorrow to a cephalosporin and flagyl.    Meds:  metroNIDAZOLE  IVPB 500milliGRAM(s) IV Intermittent every 8 hours  vancomycin  IVPB  IV Intermittent   vancomycin  IVPB 1000milliGRAM(s) IV Intermittent every 12 hours  aztreonam  IVPB 1000milliGRAM(s) IV Intermittent every 8 hours  aztreonam  IVPB  IV Intermittent     Allergies    penicillin (Hives)    Intolerances        VITALS:  Vital Signs Last 24 Hrs  T(C): 37, Max: 38.2 (05-28 @ 20:43)  T(F): 98.6, Max: 100.8 (05-28 @ 20:43)  HR: 74 (74 - 102)  BP: 119/53 (110/53 - 128/56)  Vancomycin Level, Trough -Pre 4th Dose, order if dosed q6/8/12h (05.29.17 @ 05:02)    Vancomycin Level, Trough: 4.9: Vancomycin trough levels should be rapidly reached and maintained at  15-20 ug/ml for life threatening MRSA  infections such as sepsis, endocarditis, osteomyelitis and pneumonia. A  first trough level should be drawn  before the 3rd or 4th dose.  Risk4.9:  of renal toxicity is increased for levels >15 ug/ml, in patients on  other nephrotoxic drugs, who are  hemodynamically unstable, have unstable renal function, or are on  Vancomycin therapy for >14 days. Renal function with  creatinine levels should b4.9: e monitored for those patients. ug/mL    BP(mean): --  RR: 18 (17 - 18)  SpO2: 98% (98% - 98%)    LABS/DIAGNOSTIC TESTS:  Vancomycin Level, Trough: 4.9                             11.4   10.7  )-----------( 243      ( 29 May 2017 06:35 )             34.7         05-29    145  |  114<H>  |  8   ----------------------------<  113<H>  3.3<L>   |  23  |  0.65    Ca    8.5      29 May 2017 06:35    TPro  7.7  /  Alb  2.6<L>  /  TBili  2.6<H>  /  DBili  x   /  AST  20  /  ALT  18  /  AlkPhos  97  05-27      LIVER FUNCTIONS - ( 27 May 2017 16:31 )  Alb: 2.6 g/dL / Pro: 7.7 g/dL / ALK PHOS: 97 U/L / ALT: 18 U/L DA / AST: 20 U/L / GGT: x             CULTURES: .Blood Blood  05-28 @ 11:49   No growth to date.  --  --            RADIOLOGY:      ROS:  [  ] UNABLE TO ELICIT

## 2017-05-29 NOTE — PROGRESS NOTE ADULT - SUBJECTIVE AND OBJECTIVE BOX
INTERVAL HPI/OVERNIGHT EVENTS:  Pt resting comfortably. Tmax 100.8 last night after ambulating.   NPO  +flatus   Denies N/V    MEDICATIONS  (STANDING):  dextrose 5% + sodium chloride 0.9%. 1000milliLiter(s) IV Continuous <Continuous>  pantoprazole  Injectable 40milliGRAM(s) IV Push daily  metroNIDAZOLE  IVPB 500milliGRAM(s) IV Intermittent every 8 hours  heparin  Injectable 5000Unit(s) SubCutaneous every 8 hours  vancomycin  IVPB  IV Intermittent   vancomycin  IVPB 1000milliGRAM(s) IV Intermittent every 12 hours  aztreonam  IVPB 1000milliGRAM(s) IV Intermittent every 8 hours  aztreonam  IVPB  IV Intermittent   potassium chloride  10 mEq/100 mL IVPB 10milliEquivalent(s) IV Intermittent every 1 hour    MEDICATIONS  (PRN):  ondansetron Injectable 4milliGRAM(s) IV Push every 6 hours PRN Nausea and/or Vomiting  morphine  - Injectable 4milliGRAM(s) IV Push every 4 hours PRN Moderate Pain (4 - 6)  acetaminophen  Suppository 650milliGRAM(s) Rectal every 6 hours PRN For Temp greater than 38 C (100.4 F)      Vital Signs Last 24 Hrs  T(C): 37, Max: 38.2 (05-28 @ 20:43)  T(F): 98.6, Max: 100.8 (05-28 @ 20:43)  HR: 74 (74 - 102)  BP: 119/53 (110/53 - 128/56)  BP(mean): --  RR: 18 (17 - 18)  SpO2: 98% (98% - 98%)    Physical:  General: A&Ox3. NAD.  Abdomen: Soft nondistended, lower abdomen tenderness.    I&O's Summary    UO 950mL adis      LABS:                        11.4   10.7  )-----------( 243      ( 29 May 2017 06:35 )             34.7             05-29    145  |  114<H>  |  8   ----------------------------<  113<H>  3.3<L>   |  23  |  0.65    Ca    8.5      29 May 2017 06:35    TPro  7.7  /  Alb  2.6<L>  /  TBili  2.6<H>  /  DBili  x   /  AST  20  /  ALT  18  /  AlkPhos  97  05-27

## 2017-05-30 LAB
ANION GAP SERPL CALC-SCNC: 6 MMOL/L — SIGNIFICANT CHANGE UP (ref 5–17)
BUN SERPL-MCNC: 4 MG/DL — LOW (ref 7–18)
CALCIUM SERPL-MCNC: 8.4 MG/DL — SIGNIFICANT CHANGE UP (ref 8.4–10.5)
CHLORIDE SERPL-SCNC: 111 MMOL/L — HIGH (ref 96–108)
CO2 SERPL-SCNC: 25 MMOL/L — SIGNIFICANT CHANGE UP (ref 22–31)
CREAT SERPL-MCNC: 0.65 MG/DL — SIGNIFICANT CHANGE UP (ref 0.5–1.3)
GLUCOSE SERPL-MCNC: 123 MG/DL — HIGH (ref 70–99)
HCT VFR BLD CALC: 33.3 % — LOW (ref 34.5–45)
HGB BLD-MCNC: 11.1 G/DL — LOW (ref 11.5–15.5)
MCHC RBC-ENTMCNC: 30.5 PG — SIGNIFICANT CHANGE UP (ref 27–34)
MCHC RBC-ENTMCNC: 33.3 GM/DL — SIGNIFICANT CHANGE UP (ref 32–36)
MCV RBC AUTO: 91.6 FL — SIGNIFICANT CHANGE UP (ref 80–100)
PLATELET # BLD AUTO: 235 K/UL — SIGNIFICANT CHANGE UP (ref 150–400)
POTASSIUM SERPL-MCNC: 3.1 MMOL/L — LOW (ref 3.5–5.3)
POTASSIUM SERPL-SCNC: 3.1 MMOL/L — LOW (ref 3.5–5.3)
RBC # BLD: 3.63 M/UL — LOW (ref 3.8–5.2)
RBC # FLD: 12.1 % — SIGNIFICANT CHANGE UP (ref 10.3–14.5)
SODIUM SERPL-SCNC: 142 MMOL/L — SIGNIFICANT CHANGE UP (ref 135–145)
VANCOMYCIN TROUGH SERPL-MCNC: 11.2 UG/ML — SIGNIFICANT CHANGE UP (ref 10–20)
WBC # BLD: 9.6 K/UL — SIGNIFICANT CHANGE UP (ref 3.8–10.5)
WBC # FLD AUTO: 9.6 K/UL — SIGNIFICANT CHANGE UP (ref 3.8–10.5)

## 2017-05-30 PROCEDURE — 74177 CT ABD & PELVIS W/CONTRAST: CPT | Mod: 26

## 2017-05-30 RX ORDER — POTASSIUM CHLORIDE 20 MEQ
40 PACKET (EA) ORAL EVERY 4 HOURS
Qty: 0 | Refills: 0 | Status: COMPLETED | OUTPATIENT
Start: 2017-05-30 | End: 2017-05-30

## 2017-05-30 RX ORDER — SODIUM CHLORIDE 9 MG/ML
1000 INJECTION, SOLUTION INTRAVENOUS
Qty: 0 | Refills: 0 | Status: DISCONTINUED | OUTPATIENT
Start: 2017-05-30 | End: 2017-05-31

## 2017-05-30 RX ADMIN — Medication 100 MILLIGRAM(S): at 05:43

## 2017-05-30 RX ADMIN — PANTOPRAZOLE SODIUM 40 MILLIGRAM(S): 20 TABLET, DELAYED RELEASE ORAL at 11:46

## 2017-05-30 RX ADMIN — Medication 50 MILLIGRAM(S): at 13:23

## 2017-05-30 RX ADMIN — HEPARIN SODIUM 5000 UNIT(S): 5000 INJECTION INTRAVENOUS; SUBCUTANEOUS at 05:43

## 2017-05-30 RX ADMIN — Medication 300 MILLIGRAM(S): at 05:43

## 2017-05-30 RX ADMIN — SODIUM CHLORIDE 150 MILLILITER(S): 9 INJECTION, SOLUTION INTRAVENOUS at 05:44

## 2017-05-30 RX ADMIN — Medication 300 MILLIGRAM(S): at 18:12

## 2017-05-30 RX ADMIN — Medication 10 MILLIGRAM(S): at 17:56

## 2017-05-30 RX ADMIN — Medication 40 MILLIEQUIVALENT(S): at 09:53

## 2017-05-30 RX ADMIN — Medication 100 MILLIGRAM(S): at 21:06

## 2017-05-30 RX ADMIN — HEPARIN SODIUM 5000 UNIT(S): 5000 INJECTION INTRAVENOUS; SUBCUTANEOUS at 21:06

## 2017-05-30 RX ADMIN — Medication 50 MILLIGRAM(S): at 21:06

## 2017-05-30 RX ADMIN — SODIUM CHLORIDE 150 MILLILITER(S): 9 INJECTION, SOLUTION INTRAVENOUS at 09:53

## 2017-05-30 RX ADMIN — Medication 100 MILLIGRAM(S): at 13:23

## 2017-05-30 RX ADMIN — HEPARIN SODIUM 5000 UNIT(S): 5000 INJECTION INTRAVENOUS; SUBCUTANEOUS at 13:22

## 2017-05-30 RX ADMIN — Medication 50 MILLIGRAM(S): at 05:43

## 2017-05-30 RX ADMIN — Medication 40 MILLIEQUIVALENT(S): at 15:57

## 2017-05-30 NOTE — PROGRESS NOTE ADULT - SUBJECTIVE AND OBJECTIVE BOX
57y Female is under our care for perforated acute diverticulitis with peritonitis.  Patient is doing better, but still c/o abdominal discomfort and constant gas.  Due for repeat CT A/P today. Wbc count has normalized and is afebrile for over 36 hours.    REVIEW OF SYSTEMS:  [  ] Not able to illicit  General: no fevers no malaise	  Chest: no cough  GI: no N, V, D  Skin: no rashes	    MEDS:  metroNIDAZOLE  IVPB 500milliGRAM(s) IV Intermittent every 8 hours  aztreonam  IVPB 1000milliGRAM(s) IV Intermittent every 8 hours  vancomycin  IVPB 1500milliGRAM(s) IV Intermittent every 12 hours    ALLERGIES: penicillin (Hives)    VITALS:  Vital Signs Last 24 Hrs  T(C): 37.1, Max: 37.5 (05-29 @ 21:55)  T(F): 98.7, Max: 99.5 (05-29 @ 21:55)  HR: 66 (66 - 96)  BP: 118/52 (118/52 - 126/66)  BP(mean): --  RR: 16 (16 - 16)  SpO2: 98% (98% - 100%)      PHYSICAL EXAM:  HEENT: n/a  Neck: supple no LN's  Respiratory: lungs clear  Cardiovascular: S1 S2 reg no murmurs  Gastrointestinal: Hyperactive BS in all quadrants, mild distension. Non specific mild tenderness; no masses  Extremities: no edema  Skin: no rashes  Ortho: n/a  Neuro: A, A, & O  x 3    LABS/DIAGNOSTIC TESTS:                        11.1   9.6   )-----------( 235      ( 30 May 2017 08:03 )             33.3     05-30 wbc - 9.6 < 10.7 < 11.8    142  |  111<H>  |  4<L>  ----------------------------<  123<H>  3.1<L>   |  25  |  0.65    Ca    8.4      30 May 2017 08:03    CULTURES:   .Blood Blood  05-28 @ 11:49   No growth to date.  --  --      RADIOLOGY:    5/30 CT A/P with PO/ IV contrast - ordered      EXAM:  CT ABDOMEN AND PELVIS OC IC                            PROCEDURE DATE:  05/27/2017        INTERPRETATION:  CLINICAL INFORMATION: Abdominal pain and distention.    TECHNIQUE: Contrast enhanced CT of the abdomen and pelvis was performed   withcoronal and sagittal reformats. 90 cc Omnipaque 350 were   administered intravenously and 10 cc were discarded. Oral contrast was   administered.     COMPARISON: None available.    FINDINGS:    LOWER CHEST: Within normal limits.    HEPATOBILIARY: Subcentimeter hypodense hepatic lesions too small to   characterize. Gallbladder and bile ducts within normal limits.  PANCREAS: Within normal limits.  SPLEEN: Within normal limits.  ADRENALS: Within normal limits.    KIDNEYS/URETERS/BLADDER: Within normal limits.  REPRODUCTIVE ORGANS: Right ovarian dermoid measuring 6.2 x 8.3 x 7.2 cm   (TR x AP x CC). Left ovarian dermoid measuring 4 x 5.3 x 3.8 cm (TR x AP   x CC). Uterus within normal limits.    BOWEL/PERITONEUM: Small amount of pneumoperitoneum.Marked mesenteric   edema and small amount of free air and fluid along the distal ileal   mesentery. Marked submucosal edema and inflammation of the distal ileum.   Sigmoid colonic diverticulosis with mild inflammatory changes abutting   the inflameddistal ileal loops. No rim-enhancing fluid collection.   Proximal contrast and fluid distended small bowel without transition   point has appearance favoring ileus. Normal appendix. Portions of the   gastrointestinal tract are collapsed, limiting evaluation.     VESSELS:  Within normal limits.  LYMPHATICS/RETROPERITONEUM: No lymphadenopathy. No retroperitoneal   hematoma.      SOFT TISSUES: Within normal limits.  BONES: Degenerative changes at L2-L3.    IMPRESSION:   Pneumoperitoneum secondary to bowel perforation. Marked submucosal edema   and inflammation of the distal ileum with air and fluid along the ileal   mesentery. Mild inflammatory changes of the adjacent sigmoid colon with   colonic diverticulosis.    Given concentration of air and fluid along the ileal mesentery, ileal   perforation with secondary inflammation of the sigmoid colon is favored,   however perforated sigmoid diverticulitis with reactive inflammation of   the distal ileum may also be considered.

## 2017-05-30 NOTE — PROGRESS NOTE ADULT - SUBJECTIVE AND OBJECTIVE BOX
Pt seen at bedside  Patient is a 57y old  Female who presents with a chief complaint of abd pain/poss perf divertic (27 May 2017 21:27)    Pt states feels sore in abdomen but denies nausea, vomiting.  Tolerating clear liquid diet.       Vital Signs Last 24 Hrs  T(C): 37.1, Max: 37.5 (05-29 @ 21:55)  T(F): 98.7, Max: 99.5 (05-29 @ 21:55)  HR: 66 (66 - 96)  BP: 118/52 (118/52 - 126/66)  BP(mean): --  RR: 16 (16 - 16)  SpO2: 98% (98% - 100%)    Gen: awake, alert oriented NAD  Abd: soft mild tenderness, obese, not distended      MEDICATIONS  (STANDING):  dextrose 5% + sodium chloride 0.9%. 1000milliLiter(s) IV Continuous <Continuous>  pantoprazole  Injectable 40milliGRAM(s) IV Push daily  metroNIDAZOLE  IVPB 500milliGRAM(s) IV Intermittent every 8 hours  heparin  Injectable 5000Unit(s) SubCutaneous every 8 hours  aztreonam  IVPB 1000milliGRAM(s) IV Intermittent every 8 hours  aztreonam  IVPB  IV Intermittent   vancomycin  IVPB 1500milliGRAM(s) IV Intermittent every 12 hours    MEDICATIONS  (PRN):  ondansetron Injectable 4milliGRAM(s) IV Push every 6 hours PRN Nausea and/or Vomiting  morphine  - Injectable 4milliGRAM(s) IV Push every 4 hours PRN Moderate Pain (4 - 6)  acetaminophen  Suppository 650milliGRAM(s) Rectal every 6 hours PRN For Temp greater than 38 C (100.4 F)                            11.4   10.7  )-----------( 243      ( 29 May 2017 06:35 )             34.7     05-29    145  |  114<H>  |  8   ----------------------------<  113<H>  3.3<L>   |  23  |  0.65    Ca    8.5      29 May 2017 06:35          I&O's Detail    I & Os for current day (as of 30 May 2017 08:12)  =============================================  IN:    Total IN: 0 ml  ---------------------------------------------  OUT:    Indwelling Catheter - Urethral: 500 ml    Total OUT: 500 ml  ---------------------------------------------  Total NET: -500 ml

## 2017-05-30 NOTE — DIETITIAN INITIAL EVALUATION ADULT. - OTHER INFO
NKFA, no food preferences. Provided diet ed/ copies of low fiber (progressing to) high fiber. Discussed healthy lifestyle, including diet, weight loss, exercise, sleep.

## 2017-05-30 NOTE — PROGRESS NOTE ADULT - PROBLEM SELECTOR PLAN 1
1. CT scan this AM  2. NPO for CT  3. IV fluids  4. possible advance to reg if pneumoperitoneum/fluid improved or resolved  5. ambulate ad deidre  6. DVT prophylaxis

## 2017-05-30 NOTE — PROGRESS NOTE ADULT - ASSESSMENT
1) Acute perforated diverticulitis   2) Bacterial peritonitis   3) Leukocytosis - normalized  4) S/p fevers   - continue vanco to 1.5gms IV q12h D4  - cont azactam 1 gm IV q8 D4  - cont flagyl 500mgs IV q8 D4  - awaiting repeat CT A/P to be done today 1) Acute perforated diverticulitis   2) Bacterial peritonitis   3) Leukocytosis - normalized  4) S/p fevers   - continue vanco to 1.5gms IV q12h D4  - cont azactam 1 gm IV q8 D4  - cont flagyl 500mgs IV q8 D4  - awaiting repeat CT A/P to be done today, will switch to a cepholosporin after ct scan and see if she tolerates it.

## 2017-05-31 LAB
ANION GAP SERPL CALC-SCNC: 10 MMOL/L — SIGNIFICANT CHANGE UP (ref 5–17)
BUN SERPL-MCNC: 3 MG/DL — LOW (ref 7–18)
CALCIUM SERPL-MCNC: 8.6 MG/DL — SIGNIFICANT CHANGE UP (ref 8.4–10.5)
CHLORIDE SERPL-SCNC: 109 MMOL/L — HIGH (ref 96–108)
CO2 SERPL-SCNC: 25 MMOL/L — SIGNIFICANT CHANGE UP (ref 22–31)
CREAT SERPL-MCNC: 0.57 MG/DL — SIGNIFICANT CHANGE UP (ref 0.5–1.3)
GLUCOSE SERPL-MCNC: 119 MG/DL — HIGH (ref 70–99)
HCT VFR BLD CALC: 33 % — LOW (ref 34.5–45)
HGB BLD-MCNC: 11.2 G/DL — LOW (ref 11.5–15.5)
MCHC RBC-ENTMCNC: 31.1 PG — SIGNIFICANT CHANGE UP (ref 27–34)
MCHC RBC-ENTMCNC: 33.8 GM/DL — SIGNIFICANT CHANGE UP (ref 32–36)
MCV RBC AUTO: 91.9 FL — SIGNIFICANT CHANGE UP (ref 80–100)
PLATELET # BLD AUTO: 230 K/UL — SIGNIFICANT CHANGE UP (ref 150–400)
POTASSIUM SERPL-MCNC: 3.4 MMOL/L — LOW (ref 3.5–5.3)
POTASSIUM SERPL-SCNC: 3.4 MMOL/L — LOW (ref 3.5–5.3)
RBC # BLD: 3.59 M/UL — LOW (ref 3.8–5.2)
RBC # FLD: 13 % — SIGNIFICANT CHANGE UP (ref 10.3–14.5)
SODIUM SERPL-SCNC: 144 MMOL/L — SIGNIFICANT CHANGE UP (ref 135–145)
WBC # BLD: 8.3 K/UL — SIGNIFICANT CHANGE UP (ref 3.8–10.5)
WBC # FLD AUTO: 8.3 K/UL — SIGNIFICANT CHANGE UP (ref 3.8–10.5)

## 2017-05-31 PROCEDURE — 77001 FLUOROGUIDE FOR VEIN DEVICE: CPT | Mod: 26

## 2017-05-31 PROCEDURE — 36569 INSJ PICC 5 YR+ W/O IMAGING: CPT

## 2017-05-31 PROCEDURE — 76937 US GUIDE VASCULAR ACCESS: CPT | Mod: 26

## 2017-05-31 RX ORDER — SODIUM CHLORIDE 9 MG/ML
10 INJECTION INTRAMUSCULAR; INTRAVENOUS; SUBCUTANEOUS EVERY 12 HOURS
Qty: 0 | Refills: 0 | Status: DISCONTINUED | OUTPATIENT
Start: 2017-05-31 | End: 2017-06-02

## 2017-05-31 RX ORDER — ERTAPENEM SODIUM 1 G/1
1 INJECTION, POWDER, LYOPHILIZED, FOR SOLUTION INTRAMUSCULAR; INTRAVENOUS
Qty: 21 | Refills: 0 | OUTPATIENT
Start: 2017-05-31 | End: 2017-06-21

## 2017-05-31 RX ORDER — ERTAPENEM SODIUM 1 G/1
INJECTION, POWDER, LYOPHILIZED, FOR SOLUTION INTRAMUSCULAR; INTRAVENOUS
Qty: 0 | Refills: 0 | Status: DISCONTINUED | OUTPATIENT
Start: 2017-05-31 | End: 2017-06-02

## 2017-05-31 RX ORDER — SODIUM CHLORIDE 9 MG/ML
20 INJECTION INTRAMUSCULAR; INTRAVENOUS; SUBCUTANEOUS ONCE
Qty: 0 | Refills: 0 | Status: DISCONTINUED | OUTPATIENT
Start: 2017-05-31 | End: 2017-06-02

## 2017-05-31 RX ORDER — ERTAPENEM SODIUM 1 G/1
1000 INJECTION, POWDER, LYOPHILIZED, FOR SOLUTION INTRAMUSCULAR; INTRAVENOUS ONCE
Qty: 0 | Refills: 0 | Status: COMPLETED | OUTPATIENT
Start: 2017-05-31 | End: 2017-05-31

## 2017-05-31 RX ORDER — ERTAPENEM SODIUM 1 G/1
1000 INJECTION, POWDER, LYOPHILIZED, FOR SOLUTION INTRAMUSCULAR; INTRAVENOUS EVERY 24 HOURS
Qty: 0 | Refills: 0 | Status: DISCONTINUED | OUTPATIENT
Start: 2017-06-01 | End: 2017-06-02

## 2017-05-31 RX ORDER — POTASSIUM CHLORIDE 20 MEQ
10 PACKET (EA) ORAL
Qty: 0 | Refills: 0 | Status: COMPLETED | OUTPATIENT
Start: 2017-05-31 | End: 2017-05-31

## 2017-05-31 RX ORDER — SODIUM CHLORIDE 9 MG/ML
10 INJECTION INTRAMUSCULAR; INTRAVENOUS; SUBCUTANEOUS
Qty: 0 | Refills: 0 | Status: DISCONTINUED | OUTPATIENT
Start: 2017-05-31 | End: 2017-06-02

## 2017-05-31 RX ADMIN — HEPARIN SODIUM 5000 UNIT(S): 5000 INJECTION INTRAVENOUS; SUBCUTANEOUS at 21:52

## 2017-05-31 RX ADMIN — Medication 300 MILLIGRAM(S): at 05:34

## 2017-05-31 RX ADMIN — Medication 100 MILLIEQUIVALENT(S): at 11:46

## 2017-05-31 RX ADMIN — Medication 10 MILLIGRAM(S): at 11:47

## 2017-05-31 RX ADMIN — ERTAPENEM SODIUM 120 MILLIGRAM(S): 1 INJECTION, POWDER, LYOPHILIZED, FOR SOLUTION INTRAMUSCULAR; INTRAVENOUS at 13:46

## 2017-05-31 RX ADMIN — Medication 100 MILLIEQUIVALENT(S): at 10:10

## 2017-05-31 RX ADMIN — HEPARIN SODIUM 5000 UNIT(S): 5000 INJECTION INTRAVENOUS; SUBCUTANEOUS at 13:15

## 2017-05-31 RX ADMIN — Medication 50 MILLIGRAM(S): at 05:03

## 2017-05-31 RX ADMIN — Medication 100 MILLIGRAM(S): at 05:03

## 2017-05-31 RX ADMIN — HEPARIN SODIUM 5000 UNIT(S): 5000 INJECTION INTRAVENOUS; SUBCUTANEOUS at 05:33

## 2017-05-31 RX ADMIN — PANTOPRAZOLE SODIUM 40 MILLIGRAM(S): 20 TABLET, DELAYED RELEASE ORAL at 11:47

## 2017-05-31 NOTE — PROGRESS NOTE ADULT - SUBJECTIVE AND OBJECTIVE BOX
57y Female is under our care for perforated acute diverticulitis? with peritonitis,and now with a small abscess which is not able to be drained by IR. Patient is doing better overall and has no fevers or chills and minimal abdominal pain.she got her picc line and we are going to switch her abxs to invanz 1 gm iv qd    REVIEW OF SYSTEMS:  [  ] Not able to illicit  General: no fevers no malaise	  Chest: no cough  GI: no N, V, D  Skin: no rashes	    MEDS:  metroNIDAZOLE  IVPB 500milliGRAM(s) IV Intermittent every 8 hours  aztreonam  IVPB 1000milliGRAM(s) IV Intermittent every 8 hours  vancomycin  IVPB 1500milliGRAM(s) IV Intermittent every 12 hours    ALLERGIES: penicillin (Hives)  Vital Signs Last 24 Hrs  T(C): 36.8, Max: 37.9 (05-30 @ 21:41)  T(F): 98.3, Max: 100.3 (05-30 @ 21:41)  HR: 73 (73 - 87)  BP: 129/66 (124/67 - 138/63)  BP(mean): --  RR: 16 (16 - 17)  SpO2: 99% (97% - 99%)    LABS/DIAGNOSTIC TESTS                        11.2   8.3   )-----------( 230      ( 31 May 2017 07:28 )             33.0       WBC Count: 8.3 K/uL (05-31 @ 07:28)  WBC Count: 9.6 K/uL (05-30 @ 08:03)  WBC Count: 10.7 K/uL (05-29 @ 06:35)      05-31    144  |  109<H>  |  3<L>  ----------------------------<  119<H>  3.4<L>   |  25  |  0.57    Ca    8.6      31 May 2017 07:28                    Lactate:     CULTURES: .Blood Blood  05-28 @ 11:49   No growth to date.  --  --                PHYSICAL EXAM:  HEENT: n/a  Neck: supple no LN's  Respiratory: lungs clear  Cardiovascular: S1 S2 reg no murmurs  Gastrointestinal: Hyperactive BS in all quadrants, mild distension. Non specific mild tenderness; no masses  Extremities: no edema  Skin: no rashes  Ortho: n/a  Neuro: A, A, & O  x 3    5/30 CT A/P with PO/ IV contrast - ordered      EXAM:  CT ABDOMEN AND PELVIS OC IC                            PROCEDURE DATE:  05/27/2017        INTERPRETATION:  CLINICAL INFORMATION: Abdominal pain and distention.    TECHNIQUE: Contrast enhanced CT of the abdomen and pelvis was performed   withcoronal and sagittal reformats. 90 cc Omnipaque 350 were   administered intravenously and 10 cc were discarded. Oral contrast was   administered.     COMPARISON: None available.    FINDINGS:    LOWER CHEST: Within normal limits.    HEPATOBILIARY: Subcentimeter hypodense hepatic lesions too small to   characterize. Gallbladder and bile ducts within normal limits.  PANCREAS: Within normal limits.  SPLEEN: Within normal limits.  ADRENALS: Within normal limits.    KIDNEYS/URETERS/BLADDER: Within normal limits.  REPRODUCTIVE ORGANS: Right ovarian dermoid measuring 6.2 x 8.3 x 7.2 cm   (TR x AP x CC). Left ovarian dermoid measuring 4 x 5.3 x 3.8 cm (TR x AP   x CC). Uterus within normal limits.    BOWEL/PERITONEUM: Small amount of pneumoperitoneum.Marked mesenteric   edema and small amount of free air and fluid along the distal ileal   mesentery. Marked submucosal edema and inflammation of the distal ileum.   Sigmoid colonic diverticulosis with mild inflammatory changes abutting   the inflameddistal ileal loops. No rim-enhancing fluid collection.   Proximal contrast and fluid distended small bowel without transition   point has appearance favoring ileus. Normal appendix. Portions of the   gastrointestinal tract are collapsed, limiting evaluation.     VESSELS:  Within normal limits.  LYMPHATICS/RETROPERITONEUM: No lymphadenopathy. No retroperitoneal   hematoma.      SOFT TISSUES: Within normal limits.  BONES: Degenerative changes at L2-L3.    IMPRESSION:   Pneumoperitoneum secondary to bowel perforation. Marked submucosal edema   and inflammation of the distal ileum with air and fluid along the ileal   mesentery. Mild inflammatory changes of the adjacent sigmoid colon with   colonic diverticulosis.    Given concentration of air and fluid along the ileal mesentery, ileal   perforation with secondary inflammation of the sigmoid colon is favored,   however perforated sigmoid diverticulitis with reactive inflammation of   the distal ileum may also be considered.

## 2017-05-31 NOTE — PROGRESS NOTE ADULT - SUBJECTIVE AND OBJECTIVE BOX
INTERVAL HPI/OVERNIGHT EVENTS:  Pt resting comfortably. No acute complaints.   Tolerating LFD diet.     MEDICATIONS  (STANDING):  pantoprazole  Injectable 40milliGRAM(s) IV Push daily  metroNIDAZOLE  IVPB 500milliGRAM(s) IV Intermittent every 8 hours  heparin  Injectable 5000Unit(s) SubCutaneous every 8 hours  aztreonam  IVPB 1000milliGRAM(s) IV Intermittent every 8 hours  aztreonam  IVPB  IV Intermittent   vancomycin  IVPB 1500milliGRAM(s) IV Intermittent every 12 hours  dextrose 5% + sodium chloride 0.45% 1000milliLiter(s) IV Continuous <Continuous>  PARoxetine 10milliGRAM(s) Oral daily    MEDICATIONS  (PRN):  ondansetron Injectable 4milliGRAM(s) IV Push every 6 hours PRN Nausea and/or Vomiting  morphine  - Injectable 4milliGRAM(s) IV Push every 4 hours PRN Moderate Pain (4 - 6)  acetaminophen  Suppository 650milliGRAM(s) Rectal every 6 hours PRN For Temp greater than 38 C (100.4 F)      Vital Signs Last 24 Hrs  T(C): 36.8, Max: 37.9 (05-30 @ 21:41)  T(F): 98.2, Max: 100.3 (05-30 @ 21:41)  HR: 74 (74 - 87)  BP: 124/67 (124/67 - 144/65)  BP(mean): --  RR: 17 (16 - 17)  SpO2: 97% (97% - 100%)    Physical:  General: A&Ox3. NAD.  Abdomen: Soft nondistended, nontender.    LABS:                        11.1   9.6   )-----------( 235      ( 30 May 2017 08:03 )             33.3             05-30    142  |  111<H>  |  4<L>  ----------------------------<  123<H>  3.1<L>   |  25  |  0.65    Ca    8.4      30 May 2017 08:03    CT abd/pelv: Small amount of pneumoperitoneum is minimally decreased since the prior   study. Again seen is marked mesenteric edema and small amount of free air   in the mesentery. Marked edema and wall thickening of the distal colon is   again seen. A pocket of fluid and air measuring approximately 5.4 x 2.8   cm is seen (series 2, image 106). There is surrounding sigmoid   diverticulosis with wall thickening of the sigmoid colon. Trace fluid and   air noted along the right paracolic gutter and within the ileal mesentery.

## 2017-05-31 NOTE — PROGRESS NOTE ADULT - ASSESSMENT
1) Acute perforated diverticulitis/ileum  2) Bacterial peritonitis/ intraadominal abscess  3) Leukocytosis - normalized  4) S/p fevers   - d/c vanco to 1.5gms IV q12h D4  - d/c azactam 1 gm IV q8 D4  - d/c flagyl 500mgs IV q8 D4  - start invanz 1gm iv qd x 3 weeks.  repeat ct abdomen in 2 weeks as outpt

## 2017-06-01 LAB
ANION GAP SERPL CALC-SCNC: 4 MMOL/L — LOW (ref 5–17)
BUN SERPL-MCNC: 5 MG/DL — LOW (ref 7–18)
CALCIUM SERPL-MCNC: 9 MG/DL — SIGNIFICANT CHANGE UP (ref 8.4–10.5)
CHLORIDE SERPL-SCNC: 108 MMOL/L — SIGNIFICANT CHANGE UP (ref 96–108)
CO2 SERPL-SCNC: 30 MMOL/L — SIGNIFICANT CHANGE UP (ref 22–31)
CREAT SERPL-MCNC: 0.68 MG/DL — SIGNIFICANT CHANGE UP (ref 0.5–1.3)
GLUCOSE SERPL-MCNC: 90 MG/DL — SIGNIFICANT CHANGE UP (ref 70–99)
HCT VFR BLD CALC: 36.4 % — SIGNIFICANT CHANGE UP (ref 34.5–45)
HGB BLD-MCNC: 12.6 G/DL — SIGNIFICANT CHANGE UP (ref 11.5–15.5)
MCHC RBC-ENTMCNC: 32.2 PG — SIGNIFICANT CHANGE UP (ref 27–34)
MCHC RBC-ENTMCNC: 34.7 GM/DL — SIGNIFICANT CHANGE UP (ref 32–36)
MCV RBC AUTO: 92.5 FL — SIGNIFICANT CHANGE UP (ref 80–100)
PLATELET # BLD AUTO: 272 K/UL — SIGNIFICANT CHANGE UP (ref 150–400)
POTASSIUM SERPL-MCNC: 4.2 MMOL/L — SIGNIFICANT CHANGE UP (ref 3.5–5.3)
POTASSIUM SERPL-SCNC: 4.2 MMOL/L — SIGNIFICANT CHANGE UP (ref 3.5–5.3)
RBC # BLD: 3.93 M/UL — SIGNIFICANT CHANGE UP (ref 3.8–5.2)
RBC # FLD: 13.1 % — SIGNIFICANT CHANGE UP (ref 10.3–14.5)
SODIUM SERPL-SCNC: 142 MMOL/L — SIGNIFICANT CHANGE UP (ref 135–145)
WBC # BLD: 8.8 K/UL — SIGNIFICANT CHANGE UP (ref 3.8–10.5)
WBC # FLD AUTO: 8.8 K/UL — SIGNIFICANT CHANGE UP (ref 3.8–10.5)

## 2017-06-01 RX ADMIN — HEPARIN SODIUM 5000 UNIT(S): 5000 INJECTION INTRAVENOUS; SUBCUTANEOUS at 05:26

## 2017-06-01 RX ADMIN — HEPARIN SODIUM 5000 UNIT(S): 5000 INJECTION INTRAVENOUS; SUBCUTANEOUS at 13:26

## 2017-06-01 RX ADMIN — Medication 10 MILLIGRAM(S): at 13:26

## 2017-06-01 RX ADMIN — PANTOPRAZOLE SODIUM 40 MILLIGRAM(S): 20 TABLET, DELAYED RELEASE ORAL at 13:26

## 2017-06-01 RX ADMIN — HEPARIN SODIUM 5000 UNIT(S): 5000 INJECTION INTRAVENOUS; SUBCUTANEOUS at 21:45

## 2017-06-01 RX ADMIN — ERTAPENEM SODIUM 120 MILLIGRAM(S): 1 INJECTION, POWDER, LYOPHILIZED, FOR SOLUTION INTRAMUSCULAR; INTRAVENOUS at 13:27

## 2017-06-01 NOTE — DISCHARGE NOTE ADULT - CONDITIONS AT DISCHARGE
Pt AOx3 breathing unlabored no c/o resp. distress chest pain or SOB. Pt with Dx. of Perforated Diverticulum of the Large intestine with abscess formation. Pt to be Dc' ed home on IV antibiotic therapy. pt and family verbalizes understanding and agreement with DC. Pt taught PICC line care Pt with left upper arm single lumen PICC in place intact and patent no signs of infection or infiltration noted at time of discharge. Abdomen soft non tender non distended BS present in all 4 quadrants Pt tolerating diet denies any N/V. Cap refill less than 3 seconds pulses present in all extremities Pt with positive sensation and mobility no neurovascular deficit noted. Pt for DC home with [facility] for IV infusion, pt verbalizes understanding and agreement with DC Vital signs stable no distress noted. Will continue to monitor pt status.

## 2017-06-01 NOTE — PROGRESS NOTE ADULT - PROBLEM SELECTOR PLAN 1
1- reg diet  2- PICC placed 5/31  3- ID recc d/c pt on Invanz 1g QD x 3 weeks, rpt CT abd/pelvis outpt in 2 weeks  4- d/c planning for today once VNS is in place

## 2017-06-01 NOTE — DISCHARGE NOTE ADULT - HOSPITAL COURSE
57y Female admitted with perforated acute diverticulitis with peritonitis, and now with a small abscess which is not able to be drained by IR. Patient clinically improved. Diet was advanced and tolerated. Patient had a PICC line placed and for discharge home with IV antibiotics and follow up with Dr. Yanes. patient will need repeat CT scan and colonoscopy.

## 2017-06-01 NOTE — DISCHARGE NOTE ADULT - PATIENT PORTAL LINK FT
“You can access the [employer] Patient Portal, offered by [facility], by registering with the following website: [email or web address]”

## 2017-06-01 NOTE — PROGRESS NOTE ADULT - SUBJECTIVE AND OBJECTIVE BOX
57y Female with no overnight complaints. Tolerating reg diet. +flatus/bm  PICC was placed in L arm yesterday 5/31  ID recs for home IV abx in previous note    Vital Signs:  T(C): 37.1, Max: 37.4 (05-31 @ 21:42)  HR: 69 (69 - 73)  BP: 118/49 (118/49 - 131/62)  RR: 18 (16 - 18)  SpO2: 97% (96% - 99%)  Wt(kg): --    Physical Exam:  General: NAD, comfortable  Abdomen: soft, NT/ND

## 2017-06-01 NOTE — PROGRESS NOTE ADULT - SUBJECTIVE AND OBJECTIVE BOX
57y Female is under our care for perforated acute diverticulitis? with peritonitis and now with a small abscess.  Underwent PICC line placement yesterday.  Patient continues to do better and has been afebrile for past 36 hours. Due for DC home on IV antibiotics once home infusion services are established.    REVIEW OF SYSTEMS:  [  ] Not able to illicit  General: no fevers no malaise	  Chest: no cough  GI: no N, V, D, improved abdominal discomfort  Skin: no rashes	    MEDS:  Invanz 1gm IV daily    ALLERGIES: penicillin (Hives)  Vital Signs Last 24 Hrs  T(C): 37.1, Max: 37.4 (05-31 @ 21:42)  T(F): 98.7, Max: 99.3 (05-31 @ 21:42)  HR: 69 (69 - 73)  BP: 118/49 (118/49 - 131/62)  BP(mean): --  RR: 18 (16 - 18)  SpO2: 97% (96% - 99%)    LABS/DIAGNOSTIC TESTS                        12.6   8.8   )-----------( 272      ( 01 Jun 2017 07:36 )             36.4   06-01    142  |  108  |  5<L>  ----------------------------<  90  4.2   |  30  |  0.68    Ca    9.0      01 Jun 2017 07:36    CULTURES: .Blood Blood  05-28 @ 11:49   No growth to date.  --  --      PHYSICAL EXAM:  HEENT: n/a  Neck: supple no LN's  Respiratory: lungs clear  Cardiovascular: S1 S2 reg no murmurs  Gastrointestinal: +BS in all quadrants, no distension. Mild tenderness of LLQ; no masses  Extremities: no edema, right arm PICC line  Skin: no rashes  Ortho: n/a  Neuro: A, A, & O  x 3    RADIOLOGY:    EXAM:  CT ABDOMEN AND PELVIS OC IC                            PROCEDURE DATE:  05/30/2017        INTERPRETATION:  CT ABDOMEN AND PELVIS WITH IV CONTRAST    CLINICAL STATEMENT: eval pneumoperitoneum.    COMPARISON: CT abdomen pelvis 5/27/2017.    TECHNIQUE: CT scan of the abdomen and pelvis was performed with IV, with   oral contrast. Multiplanar reformations were made.    FINDINGS:  Small amount of pneumoperitoneum is minimally decreased since the prior   study. Again seen is marked mesenteric edema and small amount of free air   in the mesentery. Marked edema and wall thickening of the distal colon is   again seen. A pocket of fluid and air measuring approximately 5.4 x 2.8   cm is seen (series 2, image 106). There is surrounding sigmoid   diverticulosis with wall thickening of the sigmoid colon. Trace fluid and   air noted along the right paracolic gutter and within the ileal mesentery.    Bibasilar subsegmental atelectasis. No pleural effusion. Subcentimeter   hypodensity in the inferior right hepatic lobe is too small to   characterize but unchanged. The gallbladder, pancreas, spleen and adrenal   glands are unremarkable.    Kidneys enhance symmetrically. No hydroureteronephrosis. Subcentimeter   hypodensity in the left kidney is too small to characterize.    GI tract is unobstructed. Appendix is unremarkable.    Urinary bladder is incompletely distended, and evaluation. Uterus is   unremarkable. Right ovarian dermoid is unchanged measuring 6.2 x 8.3 x   7.2 cm (TR, AP, CC).Left ovarian dermoid also unchanged measuring 4.0 x   5.3 x 3.8 cm (TR, AP, CC). No free pelvic fluid.    Degenerative changes in the spine, worst at L2-L3.    IMPRESSION:  Small amount of pneumoperitoneum is minimally decreased since the prior   study. Persistent mesenteric edema, wall thickening of the ileum and   inflammatory changes of the adjacent sigmoid colon associated   diverticulosis. New pocket of air and fluid in the mid mesentery adjacent   to the thickened ileal loops; early abscessis not excluded.     Again, the findings are likely due to bowel perforation, with ileal   perforation and contiguous inflammation of the sigmoid colon is favored,   as before; however, perforated sigmoid diverticulitis with reactive   ileitis remains in the differential.

## 2017-06-01 NOTE — DISCHARGE NOTE ADULT - CARE PLAN
Principal Discharge DX:	Perforated diverticulum of large intestine  Goal:	resolution of collection  Instructions for follow-up, activity and diet:	Low fiber diet  Please follow up with Dr. Yanes within 1 week   continue IV antibiotics

## 2017-06-01 NOTE — DISCHARGE NOTE ADULT - MEDICATION SUMMARY - MEDICATIONS TO TAKE
I will START or STAY ON the medications listed below when I get home from the hospital:    Paxil 10 mg oral tablet  -- 1 tab(s) by mouth once a day  -- Indication: For Depression    ertapenem 1 g injectable powder for injection  -- 1 gram(s) injectable every 24 hours stop date 6/21/17  -- Indication: For Perforated diverticulum of large intestine

## 2017-06-01 NOTE — DISCHARGE NOTE ADULT - CARE PROVIDER_API CALL
Pankaj Yanes (MD), Surgery  [address] [place] [id number]  Phone: [phone]  Fax: [phone]

## 2017-06-01 NOTE — PROGRESS NOTE ADULT - ASSESSMENT
1) Acute perforated diverticulitis/ileum  2) Bacterial peritonitis/ intraadominal abscess  - continue  invanz 1gm iv qd x 3 weeks  - repeat ct abdomen in 2 weeks as outpt  - DC planning pending home infusion services approval

## 2017-06-01 NOTE — DISCHARGE NOTE ADULT - PLAN OF CARE
resolution of collection Low fiber diet  Please follow up with Dr. Yanes within 1 week   continue IV antibiotics

## 2017-06-02 VITALS
DIASTOLIC BLOOD PRESSURE: 52 MMHG | HEART RATE: 73 BPM | TEMPERATURE: 99 F | SYSTOLIC BLOOD PRESSURE: 125 MMHG | RESPIRATION RATE: 16 BRPM | OXYGEN SATURATION: 96 %

## 2017-06-02 LAB
CULTURE RESULTS: SIGNIFICANT CHANGE UP
CULTURE RESULTS: SIGNIFICANT CHANGE UP
SPECIMEN SOURCE: SIGNIFICANT CHANGE UP
SPECIMEN SOURCE: SIGNIFICANT CHANGE UP

## 2017-06-02 PROCEDURE — 80048 BASIC METABOLIC PNL TOTAL CA: CPT

## 2017-06-02 PROCEDURE — 80053 COMPREHEN METABOLIC PANEL: CPT

## 2017-06-02 PROCEDURE — 74177 CT ABD & PELVIS W/CONTRAST: CPT

## 2017-06-02 PROCEDURE — 85027 COMPLETE CBC AUTOMATED: CPT

## 2017-06-02 PROCEDURE — 96374 THER/PROPH/DIAG INJ IV PUSH: CPT | Mod: 59

## 2017-06-02 PROCEDURE — C1751: CPT

## 2017-06-02 PROCEDURE — 76937 US GUIDE VASCULAR ACCESS: CPT

## 2017-06-02 PROCEDURE — 80202 ASSAY OF VANCOMYCIN: CPT

## 2017-06-02 PROCEDURE — 87040 BLOOD CULTURE FOR BACTERIA: CPT

## 2017-06-02 PROCEDURE — 96375 TX/PRO/DX INJ NEW DRUG ADDON: CPT

## 2017-06-02 PROCEDURE — 99285 EMERGENCY DEPT VISIT HI MDM: CPT | Mod: 25

## 2017-06-02 PROCEDURE — 83605 ASSAY OF LACTIC ACID: CPT

## 2017-06-02 PROCEDURE — 99291 CRITICAL CARE FIRST HOUR: CPT | Mod: 25

## 2017-06-02 PROCEDURE — C1769: CPT

## 2017-06-02 PROCEDURE — 77001 FLUOROGUIDE FOR VEIN DEVICE: CPT

## 2017-06-02 PROCEDURE — 36569 INSJ PICC 5 YR+ W/O IMAGING: CPT

## 2017-06-02 PROCEDURE — 71046 X-RAY EXAM CHEST 2 VIEWS: CPT

## 2017-06-02 RX ORDER — ERTAPENEM SODIUM 1 G/1
1000 INJECTION, POWDER, LYOPHILIZED, FOR SOLUTION INTRAMUSCULAR; INTRAVENOUS EVERY 24 HOURS
Qty: 0 | Refills: 0 | Status: DISCONTINUED | OUTPATIENT
Start: 2017-06-02 | End: 2017-06-02

## 2017-06-02 RX ADMIN — HEPARIN SODIUM 5000 UNIT(S): 5000 INJECTION INTRAVENOUS; SUBCUTANEOUS at 11:14

## 2017-06-02 RX ADMIN — HEPARIN SODIUM 5000 UNIT(S): 5000 INJECTION INTRAVENOUS; SUBCUTANEOUS at 05:23

## 2017-06-02 RX ADMIN — Medication 10 MILLIGRAM(S): at 11:14

## 2017-06-02 RX ADMIN — ERTAPENEM SODIUM 120 MILLIGRAM(S): 1 INJECTION, POWDER, LYOPHILIZED, FOR SOLUTION INTRAMUSCULAR; INTRAVENOUS at 08:30

## 2017-06-02 RX ADMIN — PANTOPRAZOLE SODIUM 40 MILLIGRAM(S): 20 TABLET, DELAYED RELEASE ORAL at 11:14

## 2017-06-02 NOTE — PROGRESS NOTE ADULT - ASSESSMENT
1) Acute perforated diverticulitis/ileum  2) Bacterial peritonitis/ intraadominal abscess  - DC planning today  - DC on invanz 1gm iv qd x 3 weeks  - repeat ct abdomen in 2 weeks as outpt  - reconsult prn

## 2017-06-02 NOTE — PROGRESS NOTE ADULT - SUBJECTIVE AND OBJECTIVE BOX
57y Female is under our care for perforated acute diverticulitis with ?peritonitis and now with a small abscess.  Patient remains afebrile and has been tolerating low fiber diet. Due for DC home today on IV antibiotics via PICC line.    REVIEW OF SYSTEMS:  [  ] Not able to illicit  General: no fevers no malaise	  Chest: no cough  GI: no N, V, D, improved abdominal discomfort  Skin: no rashes	    MEDS:  Invanz 1gm IV daily    ALLERGIES: penicillin (Hives)  Vital Signs Last 24 Hrs  T(C): 37.2, Max: 37.2 (06-02 @ 06:03)  T(F): 98.9, Max: 98.9 (06-02 @ 06:03)  HR: 73 (73 - 84)  BP: 125/52 (120/55 - 125/52)  BP(mean): --  RR: 16 (16 - 16)  SpO2: 96% (96% - 100%)    LABS/DIAGNOSTIC TESTS:  no new labs    CULTURES: .Blood Blood  05-28 @ 11:49   No growth to date.  --  --      PHYSICAL EXAM:  HEENT: n/a  Neck: supple no LN's  Respiratory: lungs clear  Cardiovascular: S1 S2 reg no murmurs  Gastrointestinal: +BS in all quadrants, no distension. Faint tenderness of LLQ; no masses  Extremities: no edema, left arm PICC line  Skin: no rashes  Ortho: n/a  Neuro: A, A, & O  x 3    RADIOLOGY: no new studies

## 2017-06-02 NOTE — PROGRESS NOTE ADULT - ATTENDING COMMENTS
I agree with above
I agree with above
Pt admitted with sepsis and suspected perforated diverticulitis.  Repeat CT scan stable, no worsening.  Clinically improved, tolerating diet, + BM    WBC 8800    Imp;  improving steadily, PICC line in place  Plan;  D/C on IV Invanz when arranged             F/u in office
Pt remains stable  Tmax 100.3  No c/o pain    Abd;  very minimal LLQ tenderness,  + BM, tolerating diet        WBC  9600         CT;  no increased air, jacinta sigmoid collection essentially unchanged.  Not amenable to drainage    Imp  Likely localized sigmoid diverticulitis perforation  Plan PICCC line for IV antibiotics
Pt with localized pneumoperitoneum suggestive of perf diverticulitis or small bowel.  No hx of diverticular disease or symptoms.  Currently feels markedly improved with no pain and +BM.  Hungry!  No N,V.      Afebrile  Comfortable.  OOB   Abd;  obese, almost no tendernes or guarding.  Very minimal LLQ discomfort   WBC decreased to 11,000( 15)      Imp.   Probable localized diverticular perforation    Plan  IV  Vanco, Zosyn, Azactam per ID
Pt with suspected diverticular localized perforation, stable no worsening findings.  Comfortable, tolerated liquids    T 100.8 last nite, afebrile this am  Abd  mild LLQ and suprapubic tenderness;  remainder of abdomen soft    WBC decreased to 10,000    Imp  Stable  Plan  Continue Azactam, Vanco and Flagyl  Repeat CT scan in 48 hrs

## 2017-06-02 NOTE — PROGRESS NOTE ADULT - PROVIDER SPECIALTY LIST ADULT
Infectious Disease
Surgery

## 2017-06-02 NOTE — PROGRESS NOTE ADULT - PROBLEM SELECTOR PROBLEM 1
Perforated diverticulum of large intestine

## 2017-06-02 NOTE — PROGRESS NOTE ADULT - SUBJECTIVE AND OBJECTIVE BOX
INTERVAL HPI/OVERNIGHT EVENTS:  Pt resting comfortably. No acute complaints.   Tolerating reg diet.     MEDICATIONS  (STANDING):  pantoprazole  Injectable 40milliGRAM(s) IV Push daily  heparin  Injectable 5000Unit(s) SubCutaneous every 8 hours  PARoxetine 10milliGRAM(s) Oral daily  sodium chloride 0.9% lock flush 20milliLiter(s) IV Push once  ertapenem  IVPB 1000milliGRAM(s) IV Intermittent every 24 hours    MEDICATIONS  (PRN):  ondansetron Injectable 4milliGRAM(s) IV Push every 6 hours PRN Nausea and/or Vomiting  morphine  - Injectable 4milliGRAM(s) IV Push every 4 hours PRN Moderate Pain (4 - 6)  acetaminophen  Suppository 650milliGRAM(s) Rectal every 6 hours PRN For Temp greater than 38 C (100.4 F)  sodium chloride 0.9% lock flush 10milliLiter(s) IV Push every 1 hour PRN After each medication administration  sodium chloride 0.9% lock flush 10milliLiter(s) IV Push every 12 hours PRN Lumen of catheter NOT used      Vital Signs Last 24 Hrs  T(C): 37.2, Max: 37.2 (06-02 @ 06:03)  T(F): 98.9, Max: 98.9 (06-02 @ 06:03)  HR: 73 (73 - 84)  BP: 125/52 (120/55 - 125/52)  BP(mean): --  RR: 16 (16 - 16)  SpO2: 96% (96% - 100%)    Physical:  General: A&Ox3. NAD.  Abdomen: Soft nondistended, nontender.    LABS:                        12.6   8.8   )-----------( 272      ( 01 Jun 2017 07:36 )             36.4             06-01    142  |  108  |  5<L>  ----------------------------<  90  4.2   |  30  |  0.68    Ca    9.0      01 Jun 2017 07:36

## 2017-06-02 NOTE — PROGRESS NOTE ADULT - ASSESSMENT
57y.o. Female w/ intraabd collection secondary to perforated sigmoid diverticulitis and ileitis w/ PICC line

## 2017-06-05 PROBLEM — F41.9 ANXIETY DISORDER, UNSPECIFIED: Chronic | Status: ACTIVE | Noted: 2017-05-27

## 2017-06-06 PROBLEM — Z00.00 ENCOUNTER FOR PREVENTIVE HEALTH EXAMINATION: Status: ACTIVE | Noted: 2017-06-06

## 2017-06-09 DIAGNOSIS — F41.9 ANXIETY DISORDER, UNSPECIFIED: ICD-10-CM

## 2017-06-09 DIAGNOSIS — D27.1 BENIGN NEOPLASM OF LEFT OVARY: ICD-10-CM

## 2017-06-09 DIAGNOSIS — D27.0 BENIGN NEOPLASM OF RIGHT OVARY: ICD-10-CM

## 2017-06-09 DIAGNOSIS — A41.9 SEPSIS, UNSPECIFIED ORGANISM: ICD-10-CM

## 2017-06-09 DIAGNOSIS — E87.6 HYPOKALEMIA: ICD-10-CM

## 2017-06-09 DIAGNOSIS — F17.210 NICOTINE DEPENDENCE, CIGARETTES, UNCOMPLICATED: ICD-10-CM

## 2017-06-09 DIAGNOSIS — Z88.0 ALLERGY STATUS TO PENICILLIN: ICD-10-CM

## 2017-06-09 DIAGNOSIS — K57.20 DIVERTICULITIS OF LARGE INTESTINE WITH PERFORATION AND ABSCESS WITHOUT BLEEDING: ICD-10-CM

## 2017-06-13 ENCOUNTER — APPOINTMENT (OUTPATIENT)
Dept: SURGERY | Facility: CLINIC | Age: 57
End: 2017-06-13

## 2017-06-13 VITALS
SYSTOLIC BLOOD PRESSURE: 128 MMHG | OXYGEN SATURATION: 96 % | WEIGHT: 223 LBS | DIASTOLIC BLOOD PRESSURE: 82 MMHG | TEMPERATURE: 98.6 F | HEART RATE: 62 BPM | BODY MASS INDEX: 33.03 KG/M2 | HEIGHT: 69 IN

## 2017-07-19 ENCOUNTER — LABORATORY RESULT (OUTPATIENT)
Age: 57
End: 2017-07-19

## 2017-08-01 ENCOUNTER — OUTPATIENT (OUTPATIENT)
Dept: OUTPATIENT SERVICES | Facility: HOSPITAL | Age: 57
LOS: 1 days | End: 2017-08-01
Payer: COMMERCIAL

## 2017-08-01 ENCOUNTER — APPOINTMENT (OUTPATIENT)
Dept: CT IMAGING | Facility: HOSPITAL | Age: 57
End: 2017-08-01
Payer: COMMERCIAL

## 2017-08-01 DIAGNOSIS — K57.80 DIVERTICULITIS OF INTESTINE, PART UNSPECIFIED, WITH PERFORATION AND ABSCESS WITHOUT BLEEDING: ICD-10-CM

## 2017-08-01 PROCEDURE — 74177 CT ABD & PELVIS W/CONTRAST: CPT | Mod: 26

## 2017-08-01 PROCEDURE — 74177 CT ABD & PELVIS W/CONTRAST: CPT

## 2018-03-06 NOTE — ED PROVIDER NOTE - CRITICAL CARE INDICATION, MLM
No patient was critically ill... Patient was critically ill with a high probability of imminent or life threatening deterioration.

## 2020-01-14 NOTE — ED PROVIDER NOTE - CROS ED HEME ALL NEG
January 14, 2020        Joaquin Del Toro  [address]  [address]  [facility] [address]  Phone: [phone]  Fax: [phone]             [facility] [address]  [facility] [address]  Phone: [phone]   Patient: Deborah Navas   MR Number: [id number]   YOB: 1967   Date of Visit: 1/14/2020       Dear Dr. Joaquin Del Toro    Thank you for referring Deborah Navas to me for evaluation. Attached you will find relevant portions of my assessment and plan of care.    If you have questions, please do not hesitate to call me. I look forward to following Deborah Navas along with you.    Sincerely,    Johny Zarate MD    Enclosure    If you would like to receive this communication electronically, please contact [email or web address] or [phone] to request [employer] Link access.    [employer] is a tool which provides read-only access to select patient information with whom you have a relationship. Its easy to use and provides real time access to review your patients record including encounter summaries, notes, results, and demographic information.    If you feel you have received this communication in error or would no longer like to receive these types of communications, please e-mail [email or web address]        negative...

## 2021-05-24 NOTE — PROGRESS NOTE ADULT - PROBLEM/PLAN-1
DISPLAY PLAN FREE TEXT
5